# Patient Record
Sex: FEMALE | Race: WHITE | Employment: FULL TIME | URBAN - METROPOLITAN AREA
[De-identification: names, ages, dates, MRNs, and addresses within clinical notes are randomized per-mention and may not be internally consistent; named-entity substitution may affect disease eponyms.]

---

## 2018-11-19 ENCOUNTER — HOSPITAL ENCOUNTER (EMERGENCY)
Age: 48
Discharge: HOME | End: 2018-11-19
Attending: EMERGENCY MEDICINE
Payer: COMMERCIAL

## 2018-11-19 VITALS
HEART RATE: 84 BPM | RESPIRATION RATE: 18 BRPM | OXYGEN SATURATION: 98 % | HEIGHT: 65 IN | SYSTOLIC BLOOD PRESSURE: 130 MMHG | TEMPERATURE: 98 F | DIASTOLIC BLOOD PRESSURE: 84 MMHG | WEIGHT: 167 LBS | BODY MASS INDEX: 27.82 KG/M2

## 2018-11-19 DIAGNOSIS — S13.4XXA WHIPLASH INJURY TO NECK, INITIAL ENCOUNTER: ICD-10-CM
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
## 2018-11-19 PROCEDURE — 74011250637 HC RX REV CODE- 250/637: Performed by: EMERGENCY MEDICINE

## 2018-11-19 PROCEDURE — 99282 EMERGENCY DEPT VISIT SF MDM: CPT | Performed by: EMERGENCY MEDICINE

## 2018-11-19 RX ORDER — IBUPROFEN 800 MG/1
800 TABLET ORAL ONCE
Status: COMPLETED | OUTPATIENT
Start: 2018-11-19 | End: 2018-11-19

## 2018-11-19 RX ADMIN — IBUPROFEN 800 MG: 800 TABLET ORAL at 01:42
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
## 2018-11-19 NOTE — DISCHARGE INSTRUCTIONS
Rest and use Tylenol or ibuprofen for pain. Apply heating pad to ease the muscle spasm. Return to the emergency department for any other acute concerns.

## 2018-11-19 NOTE — ED NOTES
I have reviewed discharge instructions with the patient. The patient verbalized understanding. Patient left ED via Discharge Method: ambulatory to Home with (daughter). Opportunity for questions and clarification provided. Patient given 0 scripts. To continue your aftercare when you leave the hospital, you may receive an automated call from our care team to check in on how you are doing. This is a free service and part of our promise to provide the best care and service to meet your aftercare needs.  If you have questions, or wish to unsubscribe from this service please call [phone]. Thank you for Choosing our [employer] Emergency Department.

## 2018-11-19 NOTE — LETTER
[facility] EMERGENCY DEPT 
[facility] [address] [place] [id number] 
[phone] Work/School Note Date: 11/19/2018 To Whom It May concern: 
 
Rebecca Bourne was seen and treated today in the emergency room by the following provider(s): 
Attending Provider: Wilhemena Collet, MD. Rebecca Bourne, daughter, Gabriela Levin was in the emergency room with her mother and will not be able to attend am classes Sincerely, Lolly Harvey RN

## 2020-11-02 ENCOUNTER — TELEPHONE (OUTPATIENT)
Dept: PHARMACY | Age: 50
End: 2020-11-02

## 2020-11-02 NOTE — TELEPHONE ENCOUNTER
**Patient has 2 EMRS; Other EMR has no documentation**    CLINICAL PHARMACY CONSULT: NICOTINE REPLACEMENT THERAPY     SUBJECTIVE  Derl Levels currently identified as interested in nicotine replacement therapy through the [employer] Breathe Easy program.     Response per Nancy Eldridge:    Consent:  Yes I consent to enrolling in the [facility] Cessation Treatment program and having my information reviewed by a [facility] pharmacist.    Current Nicotine Products Used: E-Cigarettes/E-Vaping  # Of Cigarettes/day: Left Blank  Time Of 1st Cigarette: Left Blank  Cigar/cigarillo Frequency: Left Blank   Smokeless Tobacco Frequency: Left Blank  Ecig/vaping Frequency: 21 or more days  Other Nicotine Product Frequency: Left Blank  Health Conditions: None of these conditions apply to me.   Pregnant: No  Dentures/dental Work: No  Tried Quitting: Yes  Methods Used: Nicotine patch  Methods products To Try: Wellbutrin, Zyban, or other bupropion  Other Methods products To Try: Left Blank      Methods Patient interested in: Wellbutrin, Zyban, or other bupropion  Mailing address: [address] [place] [id number]  Phone number: Left Blank

## 2020-11-02 NOTE — LETTER
Vinny [address] [facility] Wil[address] 10 Phone: [phone], option 7 Marcelo Patricia [facility] [place] [id number]  
 
 
 
 
11/20/20 Dear Marcelo Patricia, We have been trying to reach you over the phone to follow up on the Breathe Easy questionnaire you completed through [employer]. You indicated on your answers that you may be interested in trying bupropion to help you quit smoking. Chantix, bupropion, nicotine patches, nicotine gum, and/or nicotine lozenges are available to you at no cost with a prescription through the pharmacy. Please contact us if you are interested in these medications and would like assistance getting prescriptions to the pharmacy. Thank you, 
Lillian Casillas 2 Team 
Telephone: [phone], Option #7 Fax: [phone] Email: [email or web address]

## 2020-11-13 NOTE — TELEPHONE ENCOUNTER
CLINICAL PHARMACY CONSULT: BUPROPION     SUBJECTIVE  James Finn is a 53 y.o. female currently identified as interested in bupropion through the [employer] [facility].     OBJECTIVE  CrCl cannot be calculated (No successful lab value found. ). Social History     Tobacco Use    Smoking status: Current Every Day Smoker     Packs/day: 0.50    Smokeless tobacco: Never Used    Tobacco comment: quit 2011   Substance Use Topics    Alcohol use: No       Responses per [employer] Questionnaire:    Consent: Yes I consent to enrolling in the [facility] Cessation Treatment program and having my information reviewed by a [facility] pharmacist.    Current Nicotine Products Used: E-Cigarettes/E-Vaping  # Of Cigarettes/day: Left Blank  Time Of 1st Cigarette: Left Blank  Cigar/cigarillo Frequency: Left Blank   Smokeless Tobacco Frequency: Left Blank  Ecig/vaping Frequency: 21 or more days  Other Nicotine Product Frequency: Left Blank  Health Conditions: None of these conditions apply to me. Pregnant: No  Dentures/dental Work: No  Tried Quitting: Yes  Methods Used: Nicotine patch  Methods products To Try: Wellbutrin, Zyban, or other bupropion  Other Methods products To Try: Left Blank      Methods Patient interested in: Wellbutrin, Zyban, or other bupropion  M[place] address[place] [address]  Phone number: Left Blank    ASSESSMENT    Has the patient tried quitting smoking in the past? Yes, nicotine patches    PLAN    Patient is a candidate for bupropion therapy    Attempted to reach patient to discuss medications selection for smoking cessation through [employer] Cessation program. Unable to leave message for patient to return phone call to [phone]. Will continue to contact as as appropriate.     Dio Pulliam, PharmD, [address] [facility] [phone], option 7

## 2020-11-20 NOTE — TELEPHONE ENCOUNTER
Attempted to reach patient to discuss smoking cessation therapy. Unable to leave message for patient to return phone call. Will prepare and send letter. No further outreach planned at this time.     Dieudonne Finnegan, PharmD, [address] [facility] [phone], option 7    CLINICAL PHARMACY CONSULT: MED RECONCILIATION/REVIEW ADDENDUM  For Pharmacy Admin Tracking Only  PHSO: PHSO Patient?: Yes  Total # of Interventions Recommended: Count: 1  - New Order #: 1 New Medication Order Reason(s): Needs Additional Medication Therapy  Recommended intervention potential cost savings: 0  Total Interventions Accepted: 0  Time Spent (min): 15

## 2022-05-19 PROBLEM — M75.02 ADHESIVE CAPSULITIS OF LEFT SHOULDER: Status: ACTIVE | Noted: 2022-05-19

## 2022-05-26 DIAGNOSIS — M75.02 ADHESIVE CAPSULITIS OF LEFT SHOULDER: Primary | ICD-10-CM

## 2022-06-29 ENCOUNTER — HOSPITAL ENCOUNTER (OUTPATIENT)
Dept: PHYSICAL THERAPY | Age: 52
Setting detail: RECURRING SERIES
Discharge: HOME | End: 2022-07-02
Payer: COMMERCIAL

## 2022-06-29 PROCEDURE — 97110 THERAPEUTIC EXERCISES: CPT

## 2022-06-29 PROCEDURE — 97161 PT EVAL LOW COMPLEX 20 MIN: CPT

## 2022-06-29 ASSESSMENT — PAIN SCALES - GENERAL: PAINLEVEL_OUTOF10: 9

## 2022-06-29 NOTE — PLAN OF CARE
Ellen Perdomo  : 1970  Primary: [facility] *  Secondary:  [address] @ [facility] Therapy  [address] [place] [address] [facility] [address]  Phone: [phone]  Fax: [phone] Plan Frequency: 2x/week for 90 days    Plan of Care/Certification Expiration Date: 22      PT Visit Info:    No data recorded    OUTPATIENT PHYSICAL THERAPY:OP NOTE TYPE: Initial Assessment 2022               Episode  Appt Desk         Treatment Diagnosis:  Pain in Left Shoulder (M25.512)  Stiffness of Left Shoulder, Not elsewhere classified (M25.612)  Medical/Referring Diagnosis:  No admission diagnoses are documented for this encounter. Referring Physician:  Parmjit Pearson MD MD Orders:  PT Eval and Treat   Return MD Appt:  6 weeks  Date of Onset:  Onset Date: 21     Allergies:  Patient has no allergy information on record. Restrictions/Precautions:    No data recordedNo data recorded   Medications Last Reviewed:  2022     SUBJECTIVE   History of Injury/Illness (Reason for Referral):  Pt received covid vaccine a year ago and has had ongoing left shoulder pain since that time. ROM has progressively worsened and pain has lingered with daily activities to include reaching, lifting and typical use of the shoulder for dressing, bathing  Patient Stated Goal(s):  \"to decrease pain, return to normal use of the arm\"  Initial:     9/10 Post Session:     8/10  Past Medical History/Comorbidities:   Ms. Abdirahman Casey  has a past medical history of Morbid obesity with BMI of 45.0-49.9, adult ([address] and Tachycardia. Ms. Abdirahman Casey  has no past surgical history on file.   Social History/Living Environment:   Lives With: Spouse  Type of Home: House     Prior Level of Function/Work/Activity:   Prior level of function: Independent  No data recordedNo data recorded   Learning:   Does the patient/guardian have any barriers to learning?: No barriers  Will there be a co-learner?: No  What is the preferred language of the patient/guardian?: English  Is an  required?: No  How does the patient/guardian prefer to learn new concepts?: Listening; Reading; Demonstration; Pictures/Videos     Fall Risk Scale: Total Score: 0  Tobar Fall Risk: Low (0-24)     Dominant Side:  left handed        OBJECTIVE   UE ROM:       R UE   LE UE   Flex 160 73   Abd 170 53   ER 65 35   IR 75 35       UE strength measures:     R UE L UE   Flex  5 /5  4- /5   Abduction  5 /5  4- /5   ER  5 /5  4- /5   IR  5 /5  4- /5   biceps  5 /5  4- /5   triceps  5 /5  4- /5       ASSESSMENT   Initial Assessment:  Pt continues to have increased left arm pain/shoulder pain due to effects of frozen shoulder. MRI positive for thickening of capsule. Some mild cuff tearing that appears to be old-not full thickness. Limited ROM, strength  Problem List: (Impacting functional limitations): Body Structures, Functions, Activity Limitations Requiring Skilled Therapeutic Intervention: Decreased functional mobility ; Decreased ADL status; Decreased ROM; Decreased body mechanics; Decreased strength; Decreased high-level IADLs; Increased pain; Decreased posture     Therapy Prognosis:   Therapy Prognosis: Good     Assessment Complexity:   Low Complexity  PLAN   Effective Dates: 6/29/22 TO Plan of Care/Certification Expiration Date: 09/29/22     Frequency/Duration: Plan Frequency: 2x/week for 90 days     Interventions Planned (Treatment may consist of any combination of the following):    Current Treatment Recommendations: Strengthening; ROM; Functional mobility training; ADL/Self-care training; IADL training; Neuromuscular re-education; Manual Therapy - Soft Tissue Mobilization; Manual Therapy - Joint Manipulation; Pain management; Return to work related activity; Home exercise program; Safety education & training; Patient/Caregiver education & training; Modalities; Positioning; Integrated dry needling;  Therapeutic activities     Goals: (Goals have been discussed and agreed upon with patient.)  Short-Term Functional Goals: Time Frame: 6 weeks  1. Pt will be independent with HEP. 2. Pt will be able to increase ROM by at least 10-15 degrees for improved reaching. 3. Pt will be able to dress with little compensation. Discharge Goals: Time Frame: 12 weeks  1. Pt will be able to style her hair. 2. Pt will begin sleeping on left side. 3. Pt will be able to increase ROM to at least 130 degrees or more for elevation and at least 40 degrees or more for rotation. 4. Pt will be able to lift and carry up to 10 pounds. Outcome Measure: Tool Used: SARAH Shoulder Score  Initial Score: 31/100 Most Recent: X/100 (Date: XX/XX)   Interpretation of Score: 20 questions each scored on a 3 point scale with 0 representing \"extreme difficulty or unable to perform\" and 3 representing \"no difficulty\", 3 questions each scored from 0-10 about pain, and 1 question scored 0-10 about function of the shoulder  The lower the score, the greater the functional disability. 100/100 represents no disability, pain or loss of function. Minimal clinically important difference is 11.4. Minimal detectable change is 12.1. Medical Necessity:    Skilled intervention continues to be required due to pain and loss of motion and strength. Reason For Services/Other Comments:   Patient continues to require skilled intervention due to increased pain and severe limitations in ROM and strength. Total Duration: Eval plus TE  Time In: 0845  Time Out: 0930    Regarding Jayna Young's therapy, I certify that the treatment plan above will be carried out by a therapist or under their direction. Thank you for this referral,  Catie Morin PT     Referring Physician Signature: Pauline Meigs, MD No Signature is Required for this note.         Post Session Pain  Charge Capture  PT Visit Info  POC Link  Treatment Note Link  MD Guidelines  [facility]

## 2022-06-29 NOTE — PROGRESS NOTES
Buzz Andrey  : 1970  Primary: [facility] *  Secondary:  [address] @ [facility] Therapy  [address] [facility] [address]  Phone: [phone]  Fax: [phone] Plan Frequency: 2x/week for 90 days    Plan of Care/Certification Expiration Date: 22      PT Visit Info:   No data recorded    OUTPATIENT PHYSICAL THERAPY:OP NOTE TYPE: Treatment Note 2022       Episode  }Appt Desk              Treatment Diagnosis:  Pain in Left Shoulder (M25.512)  Stiffness of Left Shoulder, Not elsewhere classified (M25.612)  Medical/Referring Diagnosis:  No admission diagnoses are documented for this encounter. Referring Physician:  Iram Iqbal MD MD Orders:  PT Eval and Treat   Date of Onset:  Onset Date: 21     Allergies:   Patient has no allergy information on record. Restrictions/Precautions:  No data recordedNo data recorded   Interventions Planned (Treatment may consist of any combination of the following):    Current Treatment Recommendations: Strengthening; ROM; Functional mobility training; ADL/Self-care training; IADL training; Neuromuscular re-education; Manual Therapy - Soft Tissue Mobilization; Manual Therapy - Joint Manipulation; Pain management; Return to work related activity; Home exercise program; Safety education & training; Patient/Caregiver education & training; Modalities; Positioning; Integrated dry needling; Therapeutic activities     Subjective Comments:  Pt reports severe pain in shoulder  Initial:}    9/10Post Session:       8/10  Medications Last Reviewed:  2022  Updated Objective Findings:  See evaluation note from today  Treatment   THERAPEUTIC EXERCISE: (15 minutes):    Exercises per grid below to improve mobility and strength. Required minimal verbal cues to promote proper body mechanics. Progressed resistance, range and repetitions as indicated.   Supine wand flexion, ER, h. Ab/add x 12  Standing wand extension x 10  Passive motion in supine    MANUAL THERAPY: (5 minutes):   Joint mobilization and Soft tissue mobilization was utilized and necessary because of the patient's restricted joint motion, loss of articular motion and restricted motion of soft tissue. GHJ mobs post and inferior direction Grade III and IV     Treatment/Session Summary:    Treatment Assessment:  Pt learned HEP to follow. Instructed in importance of performing on nontherapy days. Communication/Consultation:  None today  Equipment provided today:  None  Recommendations/Intent for next treatment session: Next visit will focus on continued ROM, manual therapy.     Total Treatment Billable Duration:  45 minutes   Time In: 0845  Time Out: [address], PT       Charge Capture  }Post Session Pain  PT Visit Info  [employer] [facility]  MD Guidelines  Scanned Media  Benefits  [employer]    Future Appointments   Date Time Provider [place]   6/30/2022  8:00 AM Faviola Mirza MD [facility] AMB   7/1/2022 11:45 AM Betsy Earl, PT [facility]FO   7/5/2022 12:30 PM Betsy Earl, PT [facility] O   7/7/2022 12:30 PM Betsy Earl, PT [facility]   7/11/2022 12:30 PM Betsy Earl, PT [facility]   7/13/2022  8:45 AM Betsy Earl, PT [facility]   7/18/2022 12:30 PM Betsy Earl, PT [facility]   7/21/2022  8:45 AM Betsy Earl, PT [facility] O

## 2022-06-30 ENCOUNTER — OFFICE VISIT (OUTPATIENT)
Dept: ORTHOPEDIC SURGERY | Age: 52
End: 2022-06-30

## 2022-06-30 DIAGNOSIS — M75.02 ADHESIVE CAPSULITIS OF LEFT SHOULDER: Primary | ICD-10-CM

## 2022-06-30 PROCEDURE — 99213 OFFICE O/P EST LOW 20 MIN: CPT | Performed by: ORTHOPAEDIC SURGERY

## 2022-06-30 NOTE — LETTER
[address],[facility] [address] [address]  Phone: [phone]  Fax: [phone]    Kailee Benson MD        June 30, 2022     Patient: Ester Rosado   YOB: 1970   Date of Visit: 6/30/2022       To Whom It May Concern: It is my medical opinion that Mohsen Whitlock may return to work on 06/30/2022 with the following restrictions: Work restrictions will include no overhead use of the left arm no repetitive use of the left arm and she may need accommodations for uniform due to limitation of her shoulder motion limiting dressing    If you have any questions or concerns, please don't hesitate to call.     Sincerely,        Kailee Benson MD

## 2022-06-30 NOTE — PROGRESS NOTES
Name: Venkat Farah  YOB: 1970  Gender: female  MRN: [id number]      CC: Follow-up (left shoulder)       HPI: Venkat Farah is a 49 y.o. female who returns for follow up on her left shoulder adhesive capsulitis. Unfortunately her first visit to physical therapy was yesterday. She was prescribed prednisone and diclofenac at her last visit. She had temporary pain relief with the prednisone but states that her symptoms have returned. The diclofenac was not effective and she has stopped taking at this time and is taking over the counter tylenol and motrin. Physical Examination:  General: no acute distress  Lungs: breathing easily  CV: regular rhythm by pulse  Left Shoulder: No obvious deformity of the biceps. Tenderness to palpation in the lateral shoulder around the deltoid. Active forward flexion to 90 degrees limited by pain as well as, passively to 100 degrees. Abduction to 45 degrees. Overall arc of motion 50 degrees arm in abduction. Assessment:     ICD-10-CM    1. Adhesive capsulitis of left shoulder  M75.02        Plan: At this time the patient has not made much progress with her shoulder she did not get into formal physical therapy until yesterday. Unfortunately I think this is significantly delayed her recovery from this injury. I think at this time we will continue with physical therapy to help regain her motion. If she feels as though she is not making progress with formal physical therapy she can call the office for consideration of intra-articular shoulder injection. Work restrictions will include no overhead use of the left arm no repetitive use of the left arm and she may need accommodations for uniform due to limitation of her shoulder motion limiting dressing      Mojgan Palma NP dictating as a scribe for MD Lotus Hill MD, [address] [facility]

## 2022-07-01 ENCOUNTER — HOSPITAL ENCOUNTER (OUTPATIENT)
Dept: PHYSICAL THERAPY | Age: 52
Setting detail: RECURRING SERIES
Discharge: HOME | End: 2022-07-04
Payer: COMMERCIAL

## 2022-07-01 PROCEDURE — 97110 THERAPEUTIC EXERCISES: CPT

## 2022-07-01 PROCEDURE — 97140 MANUAL THERAPY 1/> REGIONS: CPT

## 2022-07-01 ASSESSMENT — PAIN SCALES - GENERAL: PAINLEVEL_OUTOF10: 8

## 2022-07-01 NOTE — PROGRESS NOTES
Charito Bach  : 1970  Primary: Dusty [facility] *  Secondary:  [address] @ [facility]  [address] [facility] [place] [address]  Phone: [phone]  Fax: [phone] Plan Frequency: 2x/week for 90 days    Plan of Care/Certification Expiration Date: 22      PT Visit Info:   No data recorded    OUTPATIENT PHYSICAL THERAPY:OP NOTE TYPE: Treatment Note 2022       Episode  }Appt Desk              Treatment Diagnosis:  Pain in Left Shoulder (M25.512)  Stiffness of Left Shoulder, Not elsewhere classified (M25.612)  Medical/Referring Diagnosis:  No admission diagnoses are documented for this encounter. Referring Physician:  Lynn Mark MD MD Orders:  PT Eval and Treat   Date of Onset:  Onset Date: 21     Allergies:   Patient has no allergy information on record. Restrictions/Precautions:  No data recordedNo data recorded   Interventions Planned (Treatment may consist of any combination of the following):    Current Treatment Recommendations: Strengthening; ROM; Functional mobility training; ADL/Self-care training; IADL training; Neuromuscular re-education; Manual Therapy - Soft Tissue Mobilization; Manual Therapy - Joint Manipulation; Pain management; Return to work related activity; Home exercise program; Safety education & training; Patient/Caregiver education & training; Modalities; Positioning; Integrated dry needling; Therapeutic activities     Subjective Comments:  Pt reports arm is sore  Initial:}    8/10Post Session:       7/10  Medications Last Reviewed:  2022  Updated Objective Findings:  None Today  Treatment   THERAPEUTIC EXERCISE: (25 minutes):    Exercises per grid below to improve mobility and strength. Required minimal verbal cues to promote proper body mechanics. Progressed resistance, range and repetitions as indicated.   UBE x 6 revolutions with assistance  Pulleys flex and scaption x 5 min  Wall washing x 10  Flexion walkouts -hand on table x 10  Seated t-bar flexion x 15    MANUAL THERAPY: (20 minutes):   Joint mobilization and Soft tissue mobilization was utilized and necessary because of the patient's restricted joint motion, loss of articular motion and restricted motion of soft tissue. GHJ mobs post and inferior direction Grade III and IV   Combined with PROM  Treatment/Session Summary:    Treatment Assessment:  Post treatment today, pt was able to raise the arm over 90 degrees. Reports of arm feeling sore, but less painful overall. Encouraged pt to use the arm as much as she can  Communication/Consultation:  None today  Equipment provided today:  None  Recommendations/Intent for next treatment session: Next visit will focus on continued ROM, manual therapy.     Total Treatment Billable Duration:  45 minutes   Time In: 6426  Time Out: [address], PT       Charge Capture  }Post Session Pain  PT Visit Info  [employer] Portal  MD Guidelines  Scanned Media  Benefits  MyChart    Future Appointments   Date Time Provider Gayle Devlin   7/6/2022  4:00 PM [address], [facility]   7/7/2022 12:30 PM Meliton Mendosa, PT [facility]   7/11/2022  4:00 PM [address], PTA [facility]   7/13/2022  8:45 AM Meliton Mendsoa, PT [facility]   7/18/2022  4:00 PM [address], PTA [facility]   7/21/2022  8:45 AM Meliton Mendosa, PT [facility]   8/11/2022  8:00 AM Chantell Severino MD [facility] AMB

## 2022-07-05 ENCOUNTER — APPOINTMENT (OUTPATIENT)
Dept: PHYSICAL THERAPY | Age: 52
End: 2022-07-05
Payer: COMMERCIAL

## 2022-07-06 ENCOUNTER — HOSPITAL ENCOUNTER (OUTPATIENT)
Dept: PHYSICAL THERAPY | Age: 52
Setting detail: RECURRING SERIES
Discharge: HOME | End: 2022-07-09
Payer: COMMERCIAL

## 2022-07-06 PROCEDURE — 97110 THERAPEUTIC EXERCISES: CPT

## 2022-07-06 PROCEDURE — 97140 MANUAL THERAPY 1/> REGIONS: CPT

## 2022-07-06 RX ORDER — DICLOFENAC SODIUM 75 MG/1
TABLET, DELAYED RELEASE ORAL
Qty: 60 TABLET | Refills: 1 | Status: SHIPPED | OUTPATIENT
Start: 2022-07-06 | End: 2022-10-21 | Stop reason: SDUPTHER

## 2022-07-06 ASSESSMENT — PAIN SCALES - GENERAL: PAINLEVEL_OUTOF10: 7

## 2022-07-06 NOTE — PROGRESS NOTES
Haresh Bundy  : 1970  Primary: Luis Durán [facility] *  Secondary:  [address] @ [facility]  [address] [facility] Cherri Chávez [place] [address]  Phone: [phone]  Fax: [phone] Plan Frequency: 2x/week for 90 days    Plan of Care/Certification Expiration Date: 22      PT Visit Info:   No data recorded    OUTPATIENT PHYSICAL THERAPY:OP NOTE TYPE: Treatment Note 2022       Episode  }Appt Desk              Treatment Diagnosis:  Pain in Left Shoulder (M25.512)  Stiffness of Left Shoulder, Not elsewhere classified (M25.612)  Medical/Referring Diagnosis:  No admission diagnoses are documented for this encounter. Referring Physician:  Jamilah Perez MD MD Orders:  PT Eval and Treat   Date of Onset:  Onset Date: 21     Allergies:   Patient has no allergy information on record. Restrictions/Precautions:  No data recordedNo data recorded   Interventions Planned (Treatment may consist of any combination of the following):    Current Treatment Recommendations: Strengthening; ROM; Functional mobility training; ADL/Self-care training; IADL training; Neuromuscular re-education; Manual Therapy - Soft Tissue Mobilization; Manual Therapy - Joint Manipulation; Pain management; Return to work related activity; Home exercise program; Safety education & training; Patient/Caregiver education & training; Modalities; Positioning; Integrated dry needling; Therapeutic activities     Subjective Comments:  Patient reports she has been having some shooting pain up her arm. She states she called [facility] today. Initial:}    7/10Post Session:       7/10  Medications Last Reviewed:  2022  Updated Objective Findings:  None Today  Treatment   THERAPEUTIC EXERCISE: (25 minutes):    Exercises per grid below to improve mobility and strength. Required minimal verbal cues to promote proper body mechanics. Progressed resistance, range and repetitions as indicated.   UBE x 6 revolutions with assistance  Pulleys flex and scaption x 5 min  Wall washing x 10  Flexion walkouts -hand on table x 10  Seated t-bar flexion x 15  Pendulum in all directions 2 x 1 min    MANUAL THERAPY: (20 minutes):   Joint mobilization and Soft tissue mobilization was utilized and necessary because of the patient's restricted joint motion, loss of articular motion and restricted motion of soft tissue. GHJ mobs post and inferior direction Grade III and IV   Combined with PROM  Treatment/Session Summary:    Treatment Assessment:  Patient tolerated treatment with moderate discomfort. She indicated that her arm feels like she just got her vacine. Communication/Consultation:  None today  Equipment provided today:  None  Recommendations/Intent for next treatment session: Next visit will focus on continued ROM, manual therapy.     Total Treatment Billable Duration:  45 minutes   Time In: 0400  Time Out: [address], PTA       Charge Capture  }Post Session Pain  PT Visit Info  [employer] Portal  MD Guidelines  Scanned Media  Benefits  MyChart    Future Appointments   Date Time Provider Gayle Dixonisti   7/7/2022 12:30 PM Dea Cobb, PT [facility]   7/11/2022  4:00 PM [address], [facility]   7/13/2022  8:45 AM Dea Cobb, PT [facility]   7/18/2022  4:00 PM [address], PTA [facility]   7/21/2022  8:45 AM Dea Cobb, PT [facility]   8/11/2022  8:00 AM Tay Carter MD [facility] AMB

## 2022-07-07 ENCOUNTER — HOSPITAL ENCOUNTER (OUTPATIENT)
Dept: PHYSICAL THERAPY | Age: 52
Setting detail: RECURRING SERIES
Discharge: HOME | End: 2022-07-10
Payer: COMMERCIAL

## 2022-07-07 PROCEDURE — 97140 MANUAL THERAPY 1/> REGIONS: CPT

## 2022-07-07 PROCEDURE — 97110 THERAPEUTIC EXERCISES: CPT

## 2022-07-07 ASSESSMENT — PAIN SCALES - GENERAL: PAINLEVEL_OUTOF10: 7

## 2022-07-07 NOTE — PROGRESS NOTES
Jose Case  : 1970  Primary: Doyle Chapman [facility] *  Secondary:  [address] @ [facility]  [address] [facility] [place] [address] [address]  Phone: [phone]  Fax: [phone] Plan Frequency: 2x/week for 90 days    Plan of Care/Certification Expiration Date: 22      PT Visit Info:   No data recorded    OUTPATIENT PHYSICAL THERAPY:OP NOTE TYPE: Treatment Note 2022       Episode  }Appt Desk              Treatment Diagnosis:  Pain in Left Shoulder (M25.512)  Stiffness of Left Shoulder, Not elsewhere classified (M25.612)  Medical/Referring Diagnosis:  No admission diagnoses are documented for this encounter. Referring Physician:  John Hansen MD MD Orders:  PT Eval and Treat   Date of Onset:  Onset Date: 21     Allergies:   Patient has no allergy information on record. Restrictions/Precautions:  No data recordedNo data recorded   Interventions Planned (Treatment may consist of any combination of the following):    Current Treatment Recommendations: Strengthening; ROM; Functional mobility training; ADL/Self-care training; IADL training; Neuromuscular re-education; Manual Therapy - Soft Tissue Mobilization; Manual Therapy - Joint Manipulation; Pain management; Return to work related activity; Home exercise program; Safety education & training; Patient/Caregiver education & training; Modalities; Positioning; Integrated dry needling; Therapeutic activities     Subjective Comments:  Pt reports arm feels heavy  Initial:}    7/10Post Session:       7/10  Medications Last Reviewed:  2022  Updated Objective Findings:  None Today  Treatment   THERAPEUTIC EXERCISE: (25 minutes):    Exercises per grid below to improve mobility and strength. Required minimal verbal cues to promote proper body mechanics. Progressed resistance, range and repetitions as indicated.   UBE x 6 revolutions with assistance  Pulleys flex and scaption x 5 min  Wall washing x 10 forward and diagonally  scap retract red TB x 20  Seated t-bar flexion x 15 with stick against back of chair  Pendulum in all directions 2 x 1 min-held      MANUAL THERAPY: (20 minutes):   Joint mobilization and Soft tissue mobilization was utilized and necessary because of the patient's restricted joint motion, loss of articular motion and restricted motion of soft tissue. GHJ mobs post and inferior direction Grade III and IV   Combined with PROM  Treatment/Session Summary:    Treatment Assessment:  Pt improving greatly. She has 143 degrees of active shoulder motion and 110 degrees of active abduction. Arm still feels heavy and she still has some pain  Communication/Consultation:  None today  Equipment provided today:  None  Recommendations/Intent for next treatment session: Next visit will focus on continued ROM, manual therapy.     Total Treatment Billable Duration:  45 minutes   Time In: 6293  Time Out: SHAMEKA Klein       Charge Capture  }Post Session Pain  PT Visit Info  [employer] Portal  MD Guidelines  Scanned Media  Benefits  MyChart    Future Appointments   Date Time Provider Gayle Devlin   7/11/2022  4:00 PM [address], [facility]   7/13/2022  8:45 AM Betsy Earl, PT [facility]   7/18/2022  4:00 PM [address], [facility]   7/21/2022  8:45 AM Betsy Earl PT [facility] O   8/11/2022  8:00 AM Faviola Mirza MD [facility] AMB

## 2022-07-07 NOTE — PROGRESS NOTES
Brandyn Isidro  : 1970  Primary: Nu Simeon [facility] *  Secondary:  [address] @ [facility]  [address] [facility] Demond Manrique [place] [address]  Phone: [phone]  Fax: [phone] Plan Frequency: 2x/week for 90 days    Plan of Care/Certification Expiration Date: 22      PT Visit Info:   No data recorded    OUTPATIENT PHYSICAL THERAPY:OP NOTE TYPE: Treatment Note 2022       Episode  }Appt Desk              Treatment Diagnosis:  Pain in Left Shoulder (M25.512)  Stiffness of Left Shoulder, Not elsewhere classified (M25.612)  Medical/Referring Diagnosis:  No admission diagnoses are documented for this encounter. Referring Physician:  Marin Juarez MD MD Orders:  PT Eval and Treat   Date of Onset:  Onset Date: 21     Allergies:   Patient has no allergy information on record. Restrictions/Precautions:  No data recordedNo data recorded   Interventions Planned (Treatment may consist of any combination of the following):    Current Treatment Recommendations: Strengthening; ROM; Functional mobility training; ADL/Self-care training; IADL training; Neuromuscular re-education; Manual Therapy - Soft Tissue Mobilization; Manual Therapy - Joint Manipulation; Pain management; Return to work related activity; Home exercise program; Safety education & training; Patient/Caregiver education & training; Modalities; Positioning; Integrated dry needling; Therapeutic activities     Subjective Comments:  Pt reports arm feels heavy  Initial:}    7/10Post Session:       7/10  Medications Last Reviewed:  2022  Updated Objective Findings:  None Today  Treatment   THERAPEUTIC EXERCISE: (25 minutes):    Exercises per grid below to improve mobility and strength. Required minimal verbal cues to promote proper body mechanics. Progressed resistance, range and repetitions as indicated.   UBE x 6 revolutions with assistance  Pulleys flex and scaption x 5 min  Wall washing x 10  Flexion walkouts -hand on table x 10  Seated t-bar flexion x 15  Pendulum in all directions 2 x 1 min  t-bar flexion up against wall     MANUAL THERAPY: (20 minutes):   Joint mobilization and Soft tissue mobilization was utilized and necessary because of the patient's restricted joint motion, loss of articular motion and restricted motion of soft tissue. GHJ mobs post and inferior direction Grade III and IV   Combined with PROM  Treatment/Session Summary:    · Treatment Assessment:     · Communication/Consultation:  None today  · Equipment provided today:  None  · Recommendations/Intent for next treatment session: Next visit will focus on continued ROM, manual therapy.     Total Treatment Billable Duration:  45 minutes   Time In: [address] [place] [address]  }Post Session Pain  PT Visit [address] [facility] Portal  MD Guidelines  Scanned Media  Benefits  MyChart    Future Appointments   Date Time Provider Gayle Devlin   7/11/2022  4:00 PM [address], [facility]   7/13/2022  8:45 AM Luis Parnell PT [facility]   7/18/2022  4:00 PM [address], [facility]   7/21/2022  8:45 AM Luis Parnell PT [facility] O   8/11/2022  8:00 AM Alex Tanner MD [facility] AMB

## 2022-07-11 ENCOUNTER — HOSPITAL ENCOUNTER (OUTPATIENT)
Dept: PHYSICAL THERAPY | Age: 52
Setting detail: RECURRING SERIES
Discharge: HOME | End: 2022-07-14
Payer: COMMERCIAL

## 2022-07-11 PROCEDURE — 97110 THERAPEUTIC EXERCISES: CPT

## 2022-07-11 ASSESSMENT — PAIN SCALES - GENERAL: PAINLEVEL_OUTOF10: 5

## 2022-07-11 NOTE — PROGRESS NOTES
Buzz Andrey  : 1970  Primary: Barry [facility] *  Secondary:  [address] @ [facility]  [address] [facility] [address]  Phone: [phone]  Fax: [phone] Plan Frequency: 2x/week for 90 days    Plan of Care/Certification Expiration Date: 22      PT Visit Info:   No data recorded    OUTPATIENT PHYSICAL THERAPY:OP NOTE TYPE: Treatment Note 2022       Episode  }Appt Desk              Treatment Diagnosis:  Pain in Left Shoulder (M25.512)  Stiffness of Left Shoulder, Not elsewhere classified (M25.612)  Medical/Referring Diagnosis:  No admission diagnoses are documented for this encounter. Referring Physician:  Iram Iqbal MD MD Orders:  PT Eval and Treat   Date of Onset:  Onset Date: 21     Allergies:   Patient has no allergy information on record. Restrictions/Precautions:  No data recordedNo data recorded   Interventions Planned (Treatment may consist of any combination of the following):    Current Treatment Recommendations: Strengthening; ROM; Functional mobility training; ADL/Self-care training; IADL training; Neuromuscular re-education; Manual Therapy - Soft Tissue Mobilization; Manual Therapy - Joint Manipulation; Pain management; Return to work related activity; Home exercise program; Safety education & training; Patient/Caregiver education & training; Modalities; Positioning; Integrated dry needling; Therapeutic activities     Subjective Comments:  Patient reports that she called the doctor and told them about the swelling and is sheduled to see them this Thursday. Initial:}    5/10Post Session:       5/10  Medications Last Reviewed:  2022  Updated Objective Findings:  None Today  Treatment   THERAPEUTIC EXERCISE: ( minutes):    Exercises per grid below to improve mobility and strength. Required minimal verbal cues to promote proper body mechanics. Progressed resistance, range and repetitions as indicated.   UBE x 6

## 2022-07-13 ENCOUNTER — HOSPITAL ENCOUNTER (OUTPATIENT)
Dept: PHYSICAL THERAPY | Age: 52
Setting detail: RECURRING SERIES
End: 2022-07-13
Payer: COMMERCIAL

## 2022-07-13 NOTE — PROGRESS NOTES
Diogenes Givens  : 1970  Primary: Marylen Foyer [facility] *  Secondary:  [address] @ [facility]  [address] [place] [address] [place] [address]  Phone: [phone]  Fax: [phone] Plan Frequency: 2x/week for 90 days    Plan of Care/Certification Expiration Date: 22      PT Visit Info:  No data recorded       OUTPATIENT PHYSICAL THERAPY 2022     Appt Desk   Episode   MyChart      Pt called to cancel appointment today due to illness    Future Appointments   Date Time Provider Gayle Devlin   2022  8:20 AM Jim Chirinos MD [facility] AMB   2022  1:00 PM Khloe Mueller, PTA [facility]   2022  8:45 AM Yeison Judd, PT [facility]   2022  8:00 AM Jim Chirinos MD [facility] AMB

## 2022-07-14 ENCOUNTER — OFFICE VISIT (OUTPATIENT)
Dept: ORTHOPEDIC SURGERY | Age: 52
End: 2022-07-14

## 2022-07-14 DIAGNOSIS — M75.02 ADHESIVE CAPSULITIS OF LEFT SHOULDER: Primary | ICD-10-CM

## 2022-07-14 RX ORDER — TRIAMCINOLONE ACETONIDE 40 MG/ML
40 INJECTION, SUSPENSION INTRA-ARTICULAR; INTRAMUSCULAR ONCE
Status: COMPLETED | OUTPATIENT
Start: 2022-07-14 | End: 2022-07-14

## 2022-07-14 RX ADMIN — TRIAMCINOLONE ACETONIDE 40 MG: 40 INJECTION, SUSPENSION INTRA-ARTICULAR; INTRAMUSCULAR at 09:01

## 2022-07-14 NOTE — PROGRESS NOTES
Name: Rashawn Miller  YOB: 1970  Gender: female  MRN: [id number]      CC: Shoulder Pain (L shoulder recheck)       HPI: Rashawn Miller is a 49 y.o. female who returns for follow up on her left shoulder. She has been attending physical therapy and is interested in receiving an injection today, which was discussed at her last visit. Physical Examination:  General: no acute distress  Lungs: breathing easily  CV: regular rhythm by pulse  Left Shoulder: Exam is unchanged continues to be painful with glenohumeral provocative maneuvers and still some stiffness        Assessment:     ICD-10-CM    1. Adhesive capsulitis of left shoulder  M75.02 triamcinolone acetonide (KENALOG-40) injection 40 mg     US ARTHR/ASP/INJ MAJOR JNT/BURSA LEFT       Plan:   After informed verbal consent was obtained the left shoulder glenohumeral joint was injected under ultrasound guidance in the longitudinal axis with 6 cc of 0.25% Marcaine and 1 cc of 40 mg Kenalog. Needle was localized to the glenohumeral joint and the capsule was seen to clearly distend. Images were saved to the ultrasound machine. The patient tolerated the injection well and was given postinjection flare precautions    Continue physical therapy see her back in 6 weeks to reevaluate        Heladio Ambrosio MD, [address] [place] [address] and Sports Medicine

## 2022-07-18 ENCOUNTER — APPOINTMENT (OUTPATIENT)
Dept: PHYSICAL THERAPY | Age: 52
End: 2022-07-18
Payer: COMMERCIAL

## 2022-07-21 ENCOUNTER — APPOINTMENT (OUTPATIENT)
Dept: PHYSICAL THERAPY | Age: 52
End: 2022-07-21
Payer: COMMERCIAL

## 2022-08-11 ENCOUNTER — OFFICE VISIT (OUTPATIENT)
Dept: ORTHOPEDIC SURGERY | Age: 52
End: 2022-08-11

## 2022-08-11 DIAGNOSIS — M75.02 ADHESIVE CAPSULITIS OF LEFT SHOULDER: Primary | ICD-10-CM

## 2022-08-11 NOTE — PROGRESS NOTES
Name: Rain Rubio  YOB: 1970  Gender: female  MRN: [id number]      CC: Follow-up (L shoulder recheck)       HPI: Rain Rubio is a 49 y.o. female who returns for follow up on left shoulder pain. She reports getting a significant amount of relief from the glenohumeral injection she received last visit. However, she has noticed that the tightness and discomfort she was experiencing prior is started to return. She reports that she has been unable to attend formal physical therapy since the injection due to circumstances with the birth of her grandson. Physical Examination:  General: no acute distress  Lungs: breathing easily  CV: regular rhythm by pulse  Left Shoulder: No tenderness to palpation. Active forward flexion to 150 degrees limited by pain. External rotation with elbows at side equal bilaterally. Positive Marisol Daub. Abduction to 90 degrees. Overall arc of motion 90 degrees. Assessment:     ICD-10-CM    1. Adhesive capsulitis of left shoulder  M75.02           Plan:   She reports that she got 90% relief from intra-articular steroid injection but was unable to perform physical therapy during this time. I think that overall her range of motion is improving but she would benefit from continued formal physical therapy. Work restrictions will include no overhead use of the left arm no repetitive use of the left arm and she may need accommodations for uniform due to limitation of her shoulder motion limiting dressing. Pato Craft NP dictating as a scribe for MD Harlan Van.  Lou Su MD, [address] [place] [address] and Sports Medicine

## 2022-08-11 NOTE — LETTER
[address],[facility] [address]  Phone: [phone]  Fax: [phone]    Lee Hartley MD        August 11, 2022     Patient: Buzz Balderas   YOB: 1970   Date of Visit: 8/11/2022       To Whom it May Concern:    Chelle Leiva was seen in my clinic on 8/11/2022. She may return to work on Monday, 8/15/22 with the following restrictions:    - No overhead use of the left arm no repetitive use of the left arm   - She may need accommodations for uniform due to limitation of her shoulder motion limiting dressing    If you have any questions or concerns, please don't hesitate to call.     Sincerely,         Lee Hartley MD

## 2022-08-30 ENCOUNTER — HOSPITAL ENCOUNTER (OUTPATIENT)
Dept: PHYSICAL THERAPY | Age: 52
Setting detail: RECURRING SERIES
Discharge: HOME | End: 2022-09-02
Payer: COMMERCIAL

## 2022-08-30 PROCEDURE — 97140 MANUAL THERAPY 1/> REGIONS: CPT

## 2022-08-30 PROCEDURE — 97110 THERAPEUTIC EXERCISES: CPT

## 2022-08-30 ASSESSMENT — PAIN SCALES - GENERAL: PAINLEVEL_OUTOF10: 2

## 2022-08-30 NOTE — PROGRESS NOTES
Anne Marie Javids  : 1970  Primary: Meron Delacruz [facility] *  Secondary:  [address] @ [facility]  [address] [place] [address] [facility] [address]  Phone: [phone]  Fax: [phone] Plan Frequency: 2x/week for 90 days    Plan of Care/Certification Expiration Date: 22      PT Visit Info:   No data recorded    OUTPATIENT PHYSICAL THERAPY:OP NOTE TYPE: Treatment Note 2022       Episode  }Appt Desk              Treatment Diagnosis:  Pain in Left Shoulder (M25.512)  Stiffness of Left Shoulder, Not elsewhere classified (M25.612)  Medical/Referring Diagnosis:  No admission diagnoses are documented for this encounter. Referring Physician:  Giuseppe Hurley MD MD Orders:  PT Eval and Treat   Date of Onset:  Onset Date: 21     Allergies:   Patient has no allergy information on record. Restrictions/Precautions:  No data recordedNo data recorded   Interventions Planned (Treatment may consist of any combination of the following):    Current Treatment Recommendations: Strengthening; ROM; Functional mobility training; ADL/Self-care training; IADL training; Neuromuscular re-education; Manual Therapy - Soft Tissue Mobilization; Manual Therapy - Joint Manipulation; Pain management; Return to work related activity; Home exercise program; Safety education & training; Patient/Caregiver education & training; Modalities; Positioning; Integrated dry needling; Therapeutic activities     Subjective Comments:  Pt reports she has been performing her HEP as she can. She has been out of town due to birth of grandson and complications with his birth  Initial:}    2/10Post Session:       2/10  Medications Last Reviewed:  2022  Updated Objective Findings:  None Today  Treatment   THERAPEUTIC EXERCISE: ( minutes):    Exercises per grid below to improve mobility and strength. Required minimal verbal cues to promote proper body mechanics.   Progressed resistance, range and repetitions as indicated. UBE x 5 min  Pulleys flex and scaption x 5 min-  Wall washing x 10 forward and diagonally  scap retract red TB x 20  Seated t-bar flexion x 15 with stick against back of chair  Pendulum in all directions 2 x 1 min-held  Bicep curls red TB x 20  Shoulder ext x 20  Shoulder rows red TB x 20  Shoulder punches red TB x 20       MANUAL THERAPY: ( minutes):   Joint mobilization and Soft tissue mobilization was utilized and necessary because of the patient's restricted joint motion, loss of articular motion and restricted motion of soft tissue. GHJ mobs post and inferior direction Grade III and IV   Combined with PROM  Treatment/Session Summary:    Treatment Assessment:     Communication/Consultation:  None today  Equipment provided today:  None  Recommendations/Intent for next treatment session: Next visit will focus on continued ROM, manual therapy.     Total Treatment Billable Duration:  45 minutes        Marisa Maravilla PT       Charge Capture  }Post Session Pain  PT Visit Info  [employer] Portal  MD Guidelines  Scanned Media  Benefits  MyChart    Future Appointments   Date Time Provider [facility]   9/2/2022 12:30 PM Marisa Maravilla PT [facility]   9/8/2022 11:45 AM Marisa Maravilla PT [facility]   9/12/2022 12:30 PM Marisa Maravilla PT [facility]   9/15/2022 12:30 PM Marisa Maravilla PT [facility]   9/19/2022 12:30 PM Marisa Maravilla PT [facility]   9/22/2022  8:10 AM Timo Tyler MD [facility] AMB   9/22/2022 12:30 PM Marisa Maravilla PT [facility]

## 2022-08-30 NOTE — PROGRESS NOTES
Jenna Guzman  : 1970  Primary: Rolando Bill [facility] *  Secondary:  [address] @ [facility]  [address] [place] [address]  Phone: [phone]  Fax: [phone] Plan Frequency: 2x/week for 90 days    Plan of Care/Certification Expiration Date: 22      PT Visit Info:   No data recorded    OUTPATIENT PHYSICAL THERAPY:OP NOTE TYPE: Treatment Note 2022       Episode  }Appt Desk              Treatment Diagnosis:  Pain in Left Shoulder (M25.512)  Stiffness of Left Shoulder, Not elsewhere classified (M25.612)  Medical/Referring Diagnosis:  No admission diagnoses are documented for this encounter. Referring Physician:  Abi Franklin MD MD Orders:  PT Eval and Treat   Date of Onset:  Onset Date: 21     Allergies:   Patient has no allergy information on record. Restrictions/Precautions:  No data recordedNo data recorded   Interventions Planned (Treatment may consist of any combination of the following):    Current Treatment Recommendations: Strengthening; ROM; Functional mobility training; ADL/Self-care training; IADL training; Neuromuscular re-education; Manual Therapy - Soft Tissue Mobilization; Manual Therapy - Joint Manipulation; Pain management; Return to work related activity; Home exercise program; Safety education & training; Patient/Caregiver education & training; Modalities; Positioning; Integrated dry needling; Therapeutic activities     Subjective Comments:  Pt reports she has been performing her HEP as she can. She has been out of town due to birth of grandson and complications with his birth  Initial:}    2/10Post Session:       2/10  Medications Last Reviewed:  2022  Updated Objective Findings:  None Today  Treatment   THERAPEUTIC EXERCISE: (25 minutes):    Exercises per grid below to improve mobility and strength. Required minimal verbal cues to promote proper body mechanics.   Progressed resistance, range and repetitions as

## 2022-09-02 ENCOUNTER — HOSPITAL ENCOUNTER (OUTPATIENT)
Dept: PHYSICAL THERAPY | Age: 52
Setting detail: RECURRING SERIES
Discharge: HOME | End: 2022-09-05
Payer: COMMERCIAL

## 2022-09-02 PROCEDURE — 97110 THERAPEUTIC EXERCISES: CPT

## 2022-09-02 PROCEDURE — 97140 MANUAL THERAPY 1/> REGIONS: CPT

## 2022-09-02 ASSESSMENT — PAIN SCALES - GENERAL: PAINLEVEL_OUTOF10: 2

## 2022-09-02 NOTE — PROGRESS NOTES
Carolina Marrero  : 1970  Primary: Celena Mirza [facility] *  Secondary:  [address] @ [facility]  [address] [place] [address] Efrain Capone [place] [address]  Phone: [phone]  Fax: [phone] Plan Frequency: 2x/week for 90 days    Plan of Care/Certification Expiration Date: 22      PT Visit Info:   No data recorded    OUTPATIENT PHYSICAL THERAPY:OP NOTE TYPE: Treatment Note 2022       Episode  }Appt Desk              Treatment Diagnosis:  Pain in Left Shoulder (M25.512)  Stiffness of Left Shoulder, Not elsewhere classified (M25.612)  Medical/Referring Diagnosis:  No admission diagnoses are documented for this encounter. Referring Physician:  Sarmad Cox MD MD Orders:  PT Eval and Treat   Date of Onset:  Onset Date: 21     Allergies:   Patient has no allergy information on record. Restrictions/Precautions:  No data recordedNo data recorded   Interventions Planned (Treatment may consist of any combination of the following):    Current Treatment Recommendations: Strengthening; ROM; Functional mobility training; ADL/Self-care training; IADL training; Neuromuscular re-education; Manual Therapy - Soft Tissue Mobilization; Manual Therapy - Joint Manipulation; Pain management; Return to work related activity; Home exercise program; Safety education & training; Patient/Caregiver education & training; Modalities; Positioning; Integrated dry needling; Therapeutic activities     Subjective Comments:  Pt reports feeling like she is improving. Difficulty with reaching behind her back  Initial:}    2/10Post Session:       2/10  Medications Last Reviewed:  2022  Updated Objective Findings:  None Today  Treatment   THERAPEUTIC EXERCISE: (25 minutes):    Exercises per grid below to improve mobility and strength. Required minimal verbal cues to promote proper body mechanics. Progressed resistance, range and repetitions as indicated.   UBE x 4 revolutions with assistance  Pulleys flex and scaption x 2 min  Wall washing x 20 forward and diagonally  scap retract red TB x 20-held   t-bar flexion against wall x 20 Punches green TB x 20  Rows green TB x 20  IR with strap x 10 hold 5 sec  Resisted shoulder flexion against wall yellow TB x 10      MANUAL THERAPY: (15 minutes):   Joint mobilization and Soft tissue mobilization was utilized and necessary because of the patient's restricted joint motion, loss of articular motion and restricted motion of soft tissue. GHJ mobs post and inferior direction Grade III and IV   Combined with PROM  Scapular mobs inferior/superior and upwards rotation with pt in sidelying  Treatment/Session Summary:    Treatment Assessment:  Pt responding well to joint mobs and is gaining IR motion with manual work. Full ER noted today-passively  Communication/Consultation:  None today  Equipment provided today:  None  Recommendations/Intent for next treatment session: Next visit will focus on continued ROM, manual therapy.     Total Treatment Billable Duration:  45 minutes   Time In: 1906  Time Out: [address], PT       Charge Capture  }Post Session Pain  PT Visit Info  MedBridge Portal  MD Guidelines  Scanned Media  Benefits  MyChart    Future Appointments   Date Time Provider Gayle Devlin   9/8/2022 11:45 AM Natalieetienne Osman, PT [facility]   9/12/2022 12:30 PM Natalie Means, PT [facility]   9/15/2022 12:30 PM Natalie Means, PT [facility]   9/19/2022 12:30 PM Natalie Means, PT [facility]   9/22/2022  8:10 AM Lily Hobbs MD [facility]   9/22/2022 12:30 PM Natalie Means, PT [facility]

## 2022-09-08 ENCOUNTER — HOSPITAL ENCOUNTER (OUTPATIENT)
Dept: PHYSICAL THERAPY | Age: 52
Setting detail: RECURRING SERIES
Discharge: HOME | End: 2022-09-11
Payer: COMMERCIAL

## 2022-09-08 PROCEDURE — 97110 THERAPEUTIC EXERCISES: CPT

## 2022-09-08 PROCEDURE — 97140 MANUAL THERAPY 1/> REGIONS: CPT

## 2022-09-08 ASSESSMENT — PAIN SCALES - GENERAL: PAINLEVEL_OUTOF10: 4

## 2022-09-08 NOTE — PROGRESS NOTES
Oanh Sanchez  : 1970  Primary: [facility] *  Secondary:  [address] @ [facility]  [address] [facility] Jerson Perez [place] [address]  Phone: [phone]  Fax: [phone] Plan Frequency: 2x/week for 90 days    Plan of Care/Certification Expiration Date: 22      PT Visit Info:   No data recorded    OUTPATIENT PHYSICAL THERAPY:OP NOTE TYPE: Treatment Note 2022       Episode  }Appt Desk              Treatment Diagnosis:  Pain in Left Shoulder (M25.512)  Stiffness of Left Shoulder, Not elsewhere classified (M25.612)  Medical/Referring Diagnosis:  No admission diagnoses are documented for this encounter. Referring Physician:  Pauline Meigs, MD MD Orders:  PT Eval and Treat   Date of Onset:  Onset Date: 21     Allergies:   Patient has no allergy information on record. Restrictions/Precautions:  No data recordedNo data recorded   Interventions Planned (Treatment may consist of any combination of the following):    Current Treatment Recommendations: Strengthening; ROM; Functional mobility training; ADL/Self-care training; IADL training; Neuromuscular re-education; Manual Therapy - Soft Tissue Mobilization; Manual Therapy - Joint Manipulation; Pain management; Return to work related activity; Home exercise program; Safety education & training; Patient/Caregiver education & training; Modalities; Positioning; Integrated dry needling; Therapeutic activities     Subjective Comments:  Pt having more soreness in upper trap as well as shoulder blade region  Initial:}    4/10Post Session:       4/10  Medications Last Reviewed:  2022  Updated Objective Findings:  None Today  Treatment   THERAPEUTIC EXERCISE: (30 minutes):    Exercises per grid below to improve mobility and strength. Required minimal verbal cues to promote proper body mechanics. Progressed resistance, range and repetitions as indicated.   UBE x 6 min  Pulleys flex and scaption x 2 min-held  Pulleys IR x 15  Wall washing x 20 forward and diagonally  scap retract red TB x 20-held   t-bar flexion against wall x 20   Punches green TB x 20-held  Rows green TB x 20  Resisted shoulder flexion against wall yellow TB x 10-held  Scaption 1# x 20  Shoulder flexion to 90 1# x 20  Sidelying ER 1# x 20      MANUAL THERAPY: (15 minutes):   Joint mobilization and Soft tissue mobilization was utilized and necessary because of the patient's restricted joint motion, loss of articular motion and restricted motion of soft tissue. GHJ mobs post and inferior direction Grade III and IV   Combined with PROM  Scapular mobs inferior/superior and upwards rotation with pt in sidelying  Treatment/Session Summary:    Treatment Assessment:  Pt having increased pulling with IR motions today and flexion. She was able to complete new exercises of scaption and flexion with 1 pound weight. Passively she has full elevation and has moderate IR motion, but actively she is still hypomobile  Communication/Consultation:  None today  Equipment provided today:  None  Recommendations/Intent for next treatment session: Next visit will focus on continued ROM, manual therapy.     Total Treatment Billable Duration:  45 minutes   Time In: 0545  Time Out: [address], PT       Charge Capture  }Post Session Pain  PT Visit Info  [employer] Portal  MD Guidelines  Scanned Media  Benefits  MyChart    Future Appointments   Date Time Provider Gayle Dixonisti   9/12/2022 12:30 PM Iggy Hubbard PT [facility]   9/15/2022 12:30 PM SHAMEKA [facility]   9/19/2022 12:30 PM Iggy Hubbard PT [facility]   9/22/2022  8:10 AM Margret Rangel MD [facility]   9/22/2022 12:30 PM Iggy Hubbard PT [facility]

## 2022-09-12 ENCOUNTER — HOSPITAL ENCOUNTER (OUTPATIENT)
Dept: PHYSICAL THERAPY | Age: 52
Setting detail: RECURRING SERIES
End: 2022-09-12
Payer: COMMERCIAL

## 2022-09-12 NOTE — PROGRESS NOTES
Belinda Gonsalez  : 1970  Primary: [facility] *  Secondary:  [address] @ [facility]  [address] Ghada Carranza [place] [address]  Phone: [phone]  Fax: [phone] Plan Frequency: 2x/week for 90 days    Plan of Care/Certification Expiration Date: 22      PT Visit Info:  No data recorded       OUTPATIENT PHYSICAL THERAPY 2022     Appt Desk   Episode   MyChart      Pt called to cancel appointment today due to illness.     Future Appointments   Date Time Provider Gayle Devlin   2022 12:30 PM Bethany Martinez, PT [facility]   9/15/2022 12:30 PM Bethany Martinez PT [facility]   2022 12:30 PM Bethany Martinez PT [facility]   2022  8:10 AM Balbir Hurt MD [facility]   2022 12:30 PM Bethany Martinez PT [facility]

## 2022-10-03 ENCOUNTER — PATIENT MESSAGE (OUTPATIENT)
Dept: ORTHOPEDIC SURGERY | Age: 52
End: 2022-10-03

## 2022-10-18 ENCOUNTER — OFFICE VISIT (OUTPATIENT)
Dept: ORTHOPEDIC SURGERY | Age: 52
End: 2022-10-18

## 2022-10-18 DIAGNOSIS — M75.02 ADHESIVE CAPSULITIS OF LEFT SHOULDER: ICD-10-CM

## 2022-10-18 DIAGNOSIS — M75.22 BICEPS TENDINITIS OF LEFT SHOULDER: Primary | ICD-10-CM

## 2022-10-18 DIAGNOSIS — M19.012 ARTHRITIS OF LEFT ACROMIOCLAVICULAR JOINT: ICD-10-CM

## 2022-10-18 DIAGNOSIS — M75.42 IMPINGEMENT SYNDROME OF LEFT SHOULDER: ICD-10-CM

## 2022-10-18 PROCEDURE — 99214 OFFICE O/P EST MOD 30 MIN: CPT | Performed by: ORTHOPAEDIC SURGERY

## 2022-10-18 NOTE — PROGRESS NOTES
Name: Samantha Cerna  YOB: 1970  Gender: female  MRN: [id number]      CC: Follow-up (L shoulder recheck)       HPI: Samantha Cerna is a 47 y.o. female who returns for follow up on left shoulder pain. She is well but continues to have pain and weakness. She has attended a few sessions of formal physical therapy but has not been consistent due to complications with her new grandson. She continues to complain of severe pain with overhead activity or any increase use of the shoulder. Her symptoms have now been going on for approximately 1 year. Physical Examination:  General: no acute distress  Lungs: breathing easily  CV: regular rhythm by pulse  Left Shoulder: Tenderness palpation over the biceps anteriorly. Tenderness palpation of the AC joint. Pain with crossarm adduction over the [facility] joint. Pain with speeds pain with [place]'s. Active elevation has improved up to about 150 passively I can take her to about 160. Rotation at her side has improved as well to about 45 degrees. She has pain with [place]'s and [place]'s. She appears to have appropriate resisted rotator cuff strength and empty can internal and external rotation positions although painful. Assessment:     ICD-10-CM    1. Biceps tendinitis of left shoulder  M75.22       2. Adhesive capsulitis of left shoulder  M75.02       3. Impingement syndrome of left shoulder  M75.42       4. Arthritis of left acromioclavicular joint  M19.012           Plan:   Continued shoulder pain after an acute injury. She is exhausted conservative management with multiple injections physical therapy home exercise program and activity modification. We discussed potentially a biceps tendon sheath injection versus a repeat intra-articular or subacromial injection and continued physical therapy versus consideration of surgical intervention.   She has a very painful shoulder which has failed to improve despite all these conservative measures I think it is appropriate to consider surgical treatment at this point which would include shoulder arthroscopy biceps tenotomy, capsular release, subacromial decompression and distal clavicle resection. If there was a surprise of rotator cuff tear at the time of surgery we would repair that as well though I think this is unlikely. Reviewed the details risk and benefits of the procedure and the postoperative course associated with that which likely would be 2 to 3 weeks in a sling with progression as tolerated. I think she can go back to work with some restrictions hopefully at 4 to 6 weeks with likely a 4-month return to [address] [place] [address] depending on her progress postoperatively. All of her questions have been answered she prefers to proceed with surgery as planned. Surgical plan to be for left shoulder arthroscopy biceps tenotomy, capsular release, subacromial decompression and distal clavicle resection            Heladio Sy MD, [address] [place] [address] and Sports Medicine

## 2022-10-20 PROBLEM — M75.22 BICEPS TENDINITIS OF LEFT SHOULDER: Status: ACTIVE | Noted: 2022-10-20

## 2022-10-20 PROBLEM — M75.42 IMPINGEMENT SYNDROME OF LEFT SHOULDER: Status: ACTIVE | Noted: 2022-10-20

## 2022-10-20 PROBLEM — M19.012 ARTHRITIS OF LEFT ACROMIOCLAVICULAR JOINT: Status: ACTIVE | Noted: 2022-10-20

## 2022-10-21 RX ORDER — DICLOFENAC SODIUM 75 MG/1
TABLET, DELAYED RELEASE ORAL
Qty: 60 TABLET | Refills: 1 | Status: SHIPPED | OUTPATIENT
Start: 2022-10-21

## 2022-11-08 ENCOUNTER — TELEPHONE (OUTPATIENT)
Dept: ORTHOPEDIC SURGERY | Age: 52
End: 2022-11-08

## 2022-11-08 NOTE — TELEPHONE ENCOUNTER
I called the  on the file and was transferred to Dianah Carrel who is the adjustor on the file who would be approving/denying surgery. I left a voicemail for him at [phone]. I also will follow-up with an email to Mr. Devan Cedeño seeking authorization. His email is [email or web address]

## 2022-11-16 ENCOUNTER — TELEPHONE (OUTPATIENT)
Dept: ORTHOPEDIC SURGERY | Age: 52
End: 2022-11-16

## 2022-11-16 NOTE — TELEPHONE ENCOUNTER
Left message for Sherrie Schuster @ [facility] @ [phone] checking on the status of surgery request.

## 2022-11-26 ENCOUNTER — HOSPITAL ENCOUNTER (EMERGENCY)
Age: 52
Discharge: HOME | End: 2022-11-26
Attending: EMERGENCY MEDICINE

## 2022-11-26 VITALS
SYSTOLIC BLOOD PRESSURE: 138 MMHG | OXYGEN SATURATION: 96 % | HEIGHT: 65 IN | HEART RATE: 94 BPM | DIASTOLIC BLOOD PRESSURE: 79 MMHG | TEMPERATURE: 97.9 F | BODY MASS INDEX: 30.82 KG/M2 | RESPIRATION RATE: 20 BRPM | WEIGHT: 185 LBS

## 2022-11-26 DIAGNOSIS — J06.9 VIRAL URI: Primary | ICD-10-CM

## 2022-11-26 LAB
FLUAV AG NPH QL IA: NEGATIVE
FLUBV AG NPH QL IA: NEGATIVE
SPECIMEN SOURCE: NORMAL
STREP, MOLECULAR: NOT DETECTED

## 2022-11-26 PROCEDURE — 87651 STREP A DNA AMP PROBE: CPT

## 2022-11-26 PROCEDURE — 99283 EMERGENCY DEPT VISIT LOW MDM: CPT | Performed by: PHYSICIAN ASSISTANT

## 2022-11-26 PROCEDURE — 87804 INFLUENZA ASSAY W/OPTIC: CPT

## 2022-11-26 RX ORDER — SEMAGLUTIDE 1.34 MG/ML
INJECTION, SOLUTION SUBCUTANEOUS
COMMUNITY
Start: 2022-04-28 | End: 2022-12-02 | Stop reason: ALTCHOICE

## 2022-11-26 ASSESSMENT — PAIN - FUNCTIONAL ASSESSMENT: PAIN_FUNCTIONAL_ASSESSMENT: 0-10

## 2022-11-26 ASSESSMENT — ENCOUNTER SYMPTOMS
SORE THROAT: 1
SINUS PRESSURE: 1
NAUSEA: 0
ABDOMINAL PAIN: 0
VOMITING: 0
RHINORRHEA: 0
COUGH: 0
SHORTNESS OF BREATH: 0

## 2022-11-26 ASSESSMENT — PAIN SCALES - GENERAL: PAINLEVEL_OUTOF10: 5

## 2022-11-26 ASSESSMENT — PAIN DESCRIPTION - LOCATION: LOCATION: HEAD

## 2022-11-26 NOTE — DISCHARGE INSTRUCTIONS
Flu and strep negative. Likely viral URI. OTC agents, decongestants, nasal spray, nasal rinse, etc for symptom relief. Follow-up with family doctor. Return to ED as needed.

## 2022-11-26 NOTE — ED TRIAGE NOTES
Pt reports that she took the flu shot on Tuesday and has had a fever intermittently since then. C/o of sore throat and sinus pressure.

## 2022-11-26 NOTE — ED NOTES
I have reviewed discharge instructions with the patient. The patient verbalized understanding. Patient left ED via Discharge Method: ambulatory to Home with self. Opportunity for questions and clarification provided. Patient given 0 scripts. No esign      To continue your aftercare when you leave the hospital, you may receive an automated call from our care team to check in on how you are doing. This is a free service and part of our promise to provide the best care and service to meet your aftercare needs.  If you have questions, or wish to unsubscribe from this service please call [phone]. Thank you for Choosing our [facility] Emergency Department.       Brant De La Rosa RN  11/26/22 1123

## 2022-11-26 NOTE — ED PROVIDER NOTES
Emergency Department Provider Note                   PCP:                Radha Woo MD               Age: 47 y.o. Sex: female       ICD-10-CM    1. Viral URI  J06.9           DISPOSITION Decision To Discharge 11/26/2022 07:31:39 AM        MDM  Number of Diagnoses or Management Options  Viral URI  Diagnosis management comments: Patient is a 72-year-old female who started with 2 days of upper respiratory symptoms. On exam patient is well-appearing in no acute distress. Patient initially tachycardic on arrival with resolution without intervention by time of discharge. Flu and strep negative. Patient had a negative COVID at home earlier today. Discussed the likely viral cause and possible 5 to 7-day duration of symptoms. Encouraged sinus rinses, nasal sprays, decongestants, etc. for symptom relief as needed. Plenty of fluids and rest.  Follow-up with family doctor as needed. Return to the ED as necessary for new or worsening symptoms. Patient reports understanding of the findings here today as well as the treatment plan discussed. Patient ambulatory upon discharge in stable condition at this time. Amount and/or Complexity of Data Reviewed  Clinical lab tests: reviewed and ordered  Tests in the medicine section of CPT®: reviewed and ordered  Review and summarize past medical records: yes  Independent visualization of images, tracings, or specimens: yes    Risk of Complications, Morbidity, and/or Mortality  Presenting problems: low  Diagnostic procedures: low  Management options: low  General comments: The patient was observed in the ED.     Results Reviewed:      Recent Results (from the past 24 hour(s))  -Rapid influenza A/B antigens:   Collection Time: 11/26/22  5:56 AM  Specimen: Nasal Washing       Result                      Value             Ref Range           Influenza A Ag              Negative          NEG                 Influenza B Ag              Negative          NEG Source                                                        Nasopharyngeal  -Group A Strep Screen By PCR:   Collection Time: 11/26/22  5:57 AM  Specimen: Swab       Result                      Value             Ref Range           Strep, Molecular            Not detected      NOTD             I discussed the results of all labs, procedures, radiographs, and treatments with the patient and available family. Treatment plan is agreed upon and the patient is ready for discharge. All voiced understanding of the discharge plan and medication instructions or changes as appropriate. Questions about treatment in the ED were answered. All were encouraged to return should symptoms worsen or new problems develop. Patient Progress  Patient progress: stable             Orders Placed This Encounter   Procedures    Rapid influenza A/B antigens    Group A Strep Screen By PCR        Medications - No data to display    New Prescriptions    No medications on file        Kaynest Santos is a 47 y.o. female who presents to the Emergency Department with chief complaint of    Chief Complaint   Patient presents with    Pharyngitis      Patient is an otherwise healthy 60-year-old female who presents to facility today after getting her flu shot on Tuesday and since then has had intermittent fevers, sore throat, and sinus pressure. She states she is unsure if she is just experiencing the symptoms because of a shot or if she actually has developed the flu or potentially another type of infection. She states she did a COVID test at home and it was negative. She states her highest temperature has been 102 °F which was this morning approximately an hour and a half ago before she took some Tylenol. She states she feels karate but figured she would come in early and be evaluated and get tested and decide where to go from there.   She denies headache, ear pain, dizziness, syncope, chest pain, cough, shortness of breath, abdominal pain, nausea, vomiting, diarrhea. The history is provided by the patient. Review of Systems   Constitutional:  Positive for fever. Negative for chills. HENT:  Positive for congestion, sinus pressure and sore throat. Negative for rhinorrhea. Respiratory:  Negative for cough and shortness of breath. Cardiovascular:  Negative for chest pain. Gastrointestinal:  Negative for abdominal pain, nausea and vomiting. Genitourinary:  Negative for dysuria, frequency and urgency. Musculoskeletal:  Negative for gait problem. Skin:  Negative for rash. Neurological:  Negative for dizziness, syncope and headaches. Psychiatric/Behavioral:  Negative for agitation and behavioral problems. All other systems reviewed and are negative. Past Medical History:   Diagnosis Date    Morbid obesity with BMI of 45.0-49.9, adult ([address]     Tachycardia         History reviewed. No pertinent surgical history. History reviewed. No pertinent family history. Social History     Socioeconomic History    Marital status:      Spouse name: None    Number of children: None    Years of education: None    Highest education level: None   Tobacco Use    Smoking status: Every Day     Packs/day: 0.50     Types: Cigarettes    Smokeless tobacco: Never    Tobacco comments:     Quit smoking: quit 2011   Substance and Sexual Activity    Alcohol use: No    Drug use: No         Patient has no known allergies.      Previous Medications    DICLOFENAC (VOLTAREN) 75 MG EC TABLET    TAKE 1 TABLET BY MOUTH TWICE DAILY    FLUTICASONE (FLONASE) 50 MCG/ACT NASAL SPRAY    2 sprays by Nasal route daily    IBUPROFEN (ADVIL;MOTRIN) 600 MG TABLET    Take 600 mg by mouth 2 times daily as needed    LORATADINE-PSEUDOEPHEDRINE (CLARITIN-D 24HR)  MG PER EXTENDED RELEASE TABLET    Take 1 tablet by mouth daily    SEMAGLUTIDE,0.25 OR 0.5MG/DOS, (OZEMPIC, 0.25 OR 0.5 MG/DOSE,) 2 MG/1.5ML SOPN            Vitals signs and nursing note reviewed. Patient Vitals for the past 4 hrs:   Temp Pulse Resp BP SpO2   11/26/22 0731 -- 94 -- -- --   11/26/22 0551 97.9 °F (36.6 °C) (!) 121 20 138/79 96 %          Physical Exam  Vitals and nursing note reviewed. Constitutional:       General: She is not in acute distress. Appearance: Normal appearance. She is not ill-appearing. HENT:      Head: Normocephalic and atraumatic. Right Ear: Tympanic membrane, ear canal and external ear normal.      Left Ear: Tympanic membrane, ear canal and external ear normal.      Mouth/Throat:      Mouth: Mucous membranes are moist.      Pharynx: Oropharynx is clear. Uvula midline. Posterior oropharyngeal erythema present. No pharyngeal swelling, oropharyngeal exudate or uvula swelling. Eyes:      Extraocular Movements: Extraocular movements intact. Conjunctiva/sclera: Conjunctivae normal.   Cardiovascular:      Rate and Rhythm: Normal rate and regular rhythm. Pulses: Normal pulses. Heart sounds: Normal heart sounds. Pulmonary:      Effort: Pulmonary effort is normal.      Breath sounds: Normal breath sounds. Abdominal:      General: Abdomen is flat. Palpations: Abdomen is soft. Musculoskeletal:         General: Normal range of motion. Cervical back: Normal range of motion. Skin:     General: Skin is warm and dry. Capillary Refill: Capillary refill takes less than 2 seconds. Neurological:      General: No focal deficit present. Mental Status: She is alert and oriented to person, place, and time.    Psychiatric:         Mood and Affect: Mood normal.         Behavior: Behavior normal.        Procedures    Results for orders placed or performed during the hospital encounter of 11/26/22   Rapid influenza A/B antigens    Specimen: Nasal Washing   Result Value Ref Range    Influenza A Ag Negative NEG      Influenza B Ag Negative NEG      Source Nasopharyngeal     Group A Strep Screen By PCR    Specimen: Swab   Result Value Ref Range    Strep, Molecular Not detected NOTD          No orders to display                       Voice dictation software was used during the making of this note. This software is not perfect and grammatical and other typographical errors may be present. This note has not been completely proofread for errors.      Jimmie Greco PA-C  11/26/22 3433

## 2022-11-29 DIAGNOSIS — M75.42 IMPINGEMENT SYNDROME OF LEFT SHOULDER: ICD-10-CM

## 2022-11-29 DIAGNOSIS — M75.22 BICEPS TENDINITIS OF LEFT SHOULDER: Primary | ICD-10-CM

## 2022-11-29 DIAGNOSIS — M25.512 LEFT SHOULDER PAIN, UNSPECIFIED CHRONICITY: ICD-10-CM

## 2022-11-29 DIAGNOSIS — M19.012 ARTHRITIS OF LEFT ACROMIOCLAVICULAR JOINT: ICD-10-CM

## 2022-11-29 DIAGNOSIS — M75.02 ADHESIVE CAPSULITIS OF LEFT SHOULDER: ICD-10-CM

## 2022-12-02 ENCOUNTER — TELEPHONE (OUTPATIENT)
Dept: ORTHOPEDIC SURGERY | Age: 52
End: 2022-12-02

## 2022-12-02 ENCOUNTER — OFFICE VISIT (OUTPATIENT)
Dept: OCCUPATIONAL MEDICINE | Age: 52
End: 2022-12-02

## 2022-12-02 ENCOUNTER — OFFICE VISIT (OUTPATIENT)
Dept: ORTHOPEDIC SURGERY | Age: 52
End: 2022-12-02

## 2022-12-02 VITALS
DIASTOLIC BLOOD PRESSURE: 84 MMHG | RESPIRATION RATE: 14 BRPM | HEART RATE: 100 BPM | OXYGEN SATURATION: 96 % | TEMPERATURE: 98.3 F | SYSTOLIC BLOOD PRESSURE: 122 MMHG

## 2022-12-02 DIAGNOSIS — H65.92 LEFT NON-SUPPURATIVE OTITIS MEDIA: Primary | ICD-10-CM

## 2022-12-02 DIAGNOSIS — M75.42 IMPINGEMENT SYNDROME OF LEFT SHOULDER: ICD-10-CM

## 2022-12-02 DIAGNOSIS — J01.10 ACUTE FRONTAL SINUSITIS, RECURRENCE NOT SPECIFIED: ICD-10-CM

## 2022-12-02 DIAGNOSIS — M75.22 BICEPS TENDINITIS OF LEFT SHOULDER: ICD-10-CM

## 2022-12-02 DIAGNOSIS — H65.01 RIGHT ACUTE SEROUS OTITIS MEDIA, RECURRENCE NOT SPECIFIED: ICD-10-CM

## 2022-12-02 DIAGNOSIS — M19.012 ARTHRITIS OF LEFT ACROMIOCLAVICULAR JOINT: ICD-10-CM

## 2022-12-02 DIAGNOSIS — M75.02 ADHESIVE CAPSULITIS OF LEFT SHOULDER: Primary | ICD-10-CM

## 2022-12-02 RX ORDER — TIRZEPATIDE 2.5 MG/.5ML
2.5 INJECTION, SOLUTION SUBCUTANEOUS WEEKLY
COMMUNITY

## 2022-12-02 RX ORDER — AMOXICILLIN AND CLAVULANATE POTASSIUM 875; 125 MG/1; MG/1
1 TABLET, FILM COATED ORAL 2 TIMES DAILY
Qty: 20 TABLET | Refills: 0 | Status: SHIPPED | OUTPATIENT
Start: 2022-12-02 | End: 2022-12-12

## 2022-12-02 RX ORDER — OXYCODONE HYDROCHLORIDE 5 MG/1
5 TABLET ORAL EVERY 4 HOURS PRN
Qty: 30 TABLET | Refills: 0 | Status: SHIPPED | OUTPATIENT
Start: 2022-12-02 | End: 2022-12-07

## 2022-12-02 RX ORDER — ONDANSETRON 4 MG/1
4 TABLET, FILM COATED ORAL DAILY PRN
Qty: 30 TABLET | Refills: 0 | Status: SHIPPED | OUTPATIENT
Start: 2022-12-02

## 2022-12-02 ASSESSMENT — ENCOUNTER SYMPTOMS
RHINORRHEA: 1
SINUS PAIN: 1
RESPIRATORY NEGATIVE: 1
COUGH: 0
SORE THROAT: 0

## 2022-12-02 NOTE — LETTER
DME Patient Authorization Form    Name: Nuno Yu  : 1970  MRN: [id number]   Age: 47 y.o. Gender: female  Delivery Address: [facility]     Diagnosis:     ICD-10-CM    1. Adhesive capsulitis of left shoulder  M75.02 Arm Sling ()     Ambulatory referral to Physical Therapy      2. Impingement syndrome of left shoulder  M75.42 Arm Sling ()     Ambulatory referral to Physical Therapy      3. Biceps tendinitis of left shoulder  M75.22 Arm Sling ()     Ambulatory referral to Physical Therapy      4. Arthritis of left acromioclavicular joint  M19.012 Arm Sling ()     Ambulatory referral to Physical Therapy           Requested DME:  Arm Sling**- ($25.00) X 1 - left        Clinical Notes:     **Indicates non-covered items by insurance. Payment expected on date of service. Electronically signed by  Provider: SELENE Williamson - CNP__Date: 2022                            [facility]/[address] Tax ID # [id number]        Durable Medical Equipment and/or Orthotics Patient Consent     I understand that my physician has prescribed this medical supply as part of my treatment plan as a matter of Medical Necessity.  I understand that I have a choice in where I receive my prescribed orthopedic supplies and/or services.  I authorize [facility] to furnish this service/product and to provide my insurance carrier with any information requested in order to process for payment.  I instruct my insurance carrier to pay [facility] directly for these services/products.  I understand that my insurance carrier may deny payment for this supply because it is a non-covered item, deemed not medically necessary or considered experimental.   I understand that any cost not covered by my insurance carrier will be solely my financial responsibility.    I have received the Supplier Standards and have reviewed them.  I have received the prescribed item and have been fully instructed on the proper use of the above services/products.    ______ (Patient Initials) I understand that all DME items are non-returnable after being dispensed. Items still in sealed packaging may be returned up to 14 days after purchasing. [address] will replace items that are defective.    ______ (Patient Initials) I understand that [facility] will not file a claim with my insurance carrier for this service/product and I am waiving my right to file a claim on my own for this service/product with my insurance company as this item is NON-COVERED (Denoted by the **) by my Insurance company/policy. ______ (Patient Initials) I understand that I am responsible to bring my equipment to the hospital for any surgery. ______________________________________________  _______________________  Patient / Malaysian Payton            Thank you for considering [address] Your physician has prescribed specific medical equipment or devices for your home use. The following describes your rights and responsibilities as our customer. Right to Choose Providers: You have a choice regarding which company supplies your home medical equipment and devices, and to consult your physician in this decision. You may choose a medical supply store, a home medical equipment provider, or a specialist such as [facility]. [facility] will coordinate with your physician to provide the medical equipment or devices prescribed for your home use. Right to Service:  You have the right to considerate, respectful and nondiscriminatory care. You have the right to receive accurate and easily understood information about your health care.   If you speak a foreign language, or don't understand the discussions, assistance will be provided to allow you to make informed health care decisions. You have the right to know your treatment options and to participate in decisions about your care, including the right to accept or refuse treatment. You have the right to expect a reasonable response to your requests for treatment or service. You have the right to talk in confidence with health care providers and to have your health care information protected. You have the right to receive an explanation of your bill. You have the right to complain about the service or product you receive. Patient Responsibilities:  Please provide complete and accurate information about your health insurance benefits and make arrangements for the timely payment of your bill. [facility] will, if possible, assume responsibility for billing your insurance (Medicare, Medicaid and commercial) for the prescribed equipment or devices. If your policy does not cover the prescribed product, or only covers a portion of the bill, you are responsible for any remaining balance. Return and Exchange Policy:  [facility] will honor published  Warranties for products. [facility] will accept returns or exchanges within 14 days from the date of receipt, providin) the product must be in new condition; 2) receipt as required; and 3) used disposable and hygiene products may only be returned due to a defective product. Note: Refunds will be issued in a timely manner, please allow 4-6 weeks for processing. Complaint Procedures and DME Consumer Protection Resources:  [facility] values you as a customer, and is committed to resolving patient concerns. This commitment includes understanding and documenting your concerns, conducting a review of internal procedures, and providing you with an explanation and resolution to your concerns.   Should you have any questions about our services or billing process, please contact our office at (practice phone number). If we are unable to resolve the concern, you have the right to direct comments to the office of Consumer Protection, in the [address] or the [facility] office, without fear of repercussion. DMEPOS SUPPLIER STANDARDS    A supplier must be in compliance with all applicable Federal and [facility] and regulatory requirements. A supplier must provide complete and accurate information on the DMEPOS supplier application. Any changes to this information must be reported to the [facility] within 30 days. An authorized individual (one whose signature is binding) must sign the application for billing privileges. A supplier must fill orders from its own inventory, or must contract with other companies for the purchase of items necessary to fill the order. A supplier may not contract with any entity that is currently excluded from the Medicare program, any [facility] program, or from any other Federal procurement or Nonprocurement programs. A supplier must advise beneficiaries that they may rent or purchase inexpensive or routinely purchased durable medical equipment, and of the purchase option for capped rental equipment. A supplier must notify beneficiaries of warranty coverage and honor all warranties under applicable State Law, and repair or replace free of charge Medicare covered items that are under warranty. A supplier must maintain a physical facility on an appropriate site. A supplier must permit CMS, or its agents to conduct on-site inspections to ascertain the supplier's compliance with these standards. The supplier location must be accessible to beneficiaries during reasonable business hours, and must maintain a visible sign and posted hours of operation.   A supplier must maintain a primary business telephone listed under the name of the business in a [employer] or a toll free number available through [employer] assistance. The exclusive use of a beeper, answering machine or cell phone is prohibited. A supplier must have comprehensive liability insurance in the amount of at least $300,000 that covers both the supplier's place of business and all customers and employees of the supplier. If the supplier manufactures its own items, this insurance must also cover product liability and completed operations. A supplier must agree not to initiate telephone contact with beneficiaries, with a few exceptions allowed. This standard prohibits suppliers from calling beneficiaries in order to solicit new business. A supplier is responsible for delivery and must instruct beneficiaries on use of Medicare covered items, and maintain proof of delivery. A supplier must answer questions, and respond to complaints of the beneficiaries, and maintain documentation of such contacts. A supplier must maintain and replace at no charge or repair directly, or through a service contract with another company, Medicare covered items it has rented to beneficiaries. A supplier must accept returns of substandard (less than full quality for the particular item) or unsuitable items (inappropriate for the beneficiary at the time it was fitted and rented or sold) from beneficiaries. A supplier must disclose these supplier standards to each beneficiary to whom it supplies a Medicare-covered item. A supplier must disclose to the government any person having ownership, financial, or control interest in the supplier. A supplier must not convey or reassign a supplier number; i.e., the supplier may not sell or allow another entity to use its Medicare billing number. A supplier must have a complaint resolution protocol established to address beneficiary complaints that relate to these standards. A record of these complaints must be maintained at the physical facility.   Complaint records must include: the name, address, telephone number and health insurance claim number of the beneficiary, a summary of the complaint, and any action taken to resolve it. A supplier must agree to furnish CMS any information required by the Medicare statute and implementing regulations. A supplier of DMEPOS and other items and services must be accredited by a CMS-approved accreditation organization in order to receive and retain a supplier billing number. The accreditation must indicate the specific products and services, for which the supplier is accredited in order for the supplier to receive payment for those specific products and services. A DMEPOS supplier must notify their accreditation organization when a new DMEPOS location is opened. The accreditation organization may accredit the new supplier location for three months after it is operational without requiring a new site visit. All DMEPOS supplier locations, whether owned or subcontracted, must meet the [employer] and be separately accredited in order to bill Medicare. An accredited supplier may be denied enrollment or their enrollment may be revoked, if CMS determines that they are not in compliance with the DMEPOS quality standards. A DMEPOS supplier must disclose upon enrollment all products and services, including the addition of new product lines for which they are seeking accreditation. If a new product line is added after enrollment, the DMEPOS supplier will be responsible for notifying the accrediting body of the new product so that the DMEPOS supplier can be re-surveyed and accredited for these new products. Must meet the surety bond requirements specified in 42 C. F.R. 424.57(c). Implementation date- May 4, 2009. A supplier must obtain oxygen from a state-licensed oxygen supplier. A supplier must maintain ordering and referring documentation consistent with provisions found in 42 C. F.R. 424.516(f).   DMEPOS suppliers are prohibited from sharing a practice location with certain other Medicare providers and suppliers. DMEPOS suppliers must remain open to the public for a minimum of 30 hours per week with certain exceptions.

## 2022-12-02 NOTE — PROGRESS NOTES
Name: Valente Rondon  YOB: 1970  Gender: female  MRN: [id number]      CC: Follow-up (L shoulder pre-op)       HPI: Valente Rondon is a 47 y.o. female who returns for follow up preoperative appointment on left shoulder pain. She is scheduled for a Left Shoulder Arthroscopy Subacromial Decompression, Distal Clavicle Resection & Debridement with Dr. Kathryn Álvarez on 12/09/2022. Patient reports continued left shoulder pain and would like to proceed with surgery. Current Outpatient Medications:     oxyCODONE (ROXICODONE) 5 MG immediate release tablet, Take 1 tablet by mouth every 4 hours as needed for Pain for up to 5 days. Intended supply: 5 days. Take lowest dose possible to manage pain (Patient not taking: Reported on 12/2/2022), Disp: 30 tablet, Rfl: 0    ondansetron (ZOFRAN) 4 MG tablet, Take 1 tablet by mouth daily as needed for Nausea or Vomiting (Patient not taking: Reported on 12/2/2022), Disp: 30 tablet, Rfl: 0    amoxicillin-clavulanate (AUGMENTIN) 875-125 MG per tablet, Take 1 tablet by mouth 2 times daily for 10 days, Disp: 20 tablet, Rfl: 0    Tirzepatide (MOUNJARO) 2.5 MG/0.5ML SOPN SC injection, Inject 2.5 mg into the skin once a week, Disp: , Rfl:     diclofenac (VOLTAREN) 75 MG EC tablet, TAKE 1 TABLET BY MOUTH TWICE DAILY, Disp: 60 tablet, Rfl: 1    ibuprofen (ADVIL;MOTRIN) 600 MG tablet, Take 600 mg by mouth 2 times daily as needed, Disp: , Rfl:   No Known Allergies  Past Medical History:   Diagnosis Date    Morbid obesity with BMI of 45.0-49.9, adult (HCC)     Tachycardia      No past surgical history on file. No family history on file.   Social History     Socioeconomic History    Marital status:      Spouse name: Not on file    Number of children: Not on file    Years of education: Not on file    Highest education level: Not on file   Occupational History    Not on file   Tobacco Use    Smoking status: Every Day     Packs/day: 0.50     Types: Cigarettes    Smokeless tobacco: Never    Tobacco comments:     Quit smoking: quit 2011   Substance and Sexual Activity    Alcohol use: No    Drug use: No    Sexual activity: Not on file   Other Topics Concern    Not on file   Social History Narrative    Not on file     Social Determinants of Health     Financial Resource Strain: Not on file   Food Insecurity: Not on file   Transportation Needs: Not on file   Physical Activity: Not on file   Stress: Not on file   Social Connections: Not on file   Intimate Partner Violence: Not on file   Housing Stability: Not on file           Physical Examination:  General: no acute distress  Lungs: breathing easily, CTAB  HEENT: NCAT  Abdomen: soft  CV: regular rhythm by pulse, S1/S2 by auscultation  Left Shoulder:Tenderness palpation over the biceps anteriorly. Tenderness palpation of the AC joint. Pain with crossarm adduction over the [facility] joint. Pain with speeds pain with Ashley's. Active elevation to 150 degrees limited by pain in the extreme. She has pain with O'Allen's and Ashley's. She appears to have appropriate resisted rotator cuff strength and empty can internal and external rotation positions although painful. Assessment:   1. Adhesive capsulitis of left shoulder    2. Impingement syndrome of left shoulder    3. Biceps tendinitis of left shoulder    4. Arthritis of left acromioclavicular joint         Plan:   Surgical plans for left shoulder arthroscopy subacromial decompression, distal clavicle resection and debridement. I discussed with the patient the importance of bringing all equipment with her to the day of surgery for placement in the OR. I explained to the patient the importance of attending formal physical therapy. I provided the patient with a postoperative pain prescription and instructed her to pick it up within the next 5 days to prevent expiration and to not take the medication until after surgery.  We discussed the risks of the procedure including but not limited to infection, bleeding, anesthetic complications postoperative DVT/PE.   All of his questions have been answered and they elect to proceed as planned      SELENE Gutierrez - CNP    Orthopaedics and Sports Medicine

## 2022-12-02 NOTE — PROGRESS NOTES
Subjective:      Patient ID: Andreas Fitzgerald is a 47 y.o. female. Ear Fullness   There is pain in the left ear. This is a new problem. Episode onset: 7 days. Progression since onset: Upper respiratory infection symptoms of cough improving but ear pain and sinus pain worsening. There has been no fever. The pain is mild. Associated symptoms include headaches, hearing loss and rhinorrhea. Pertinent negatives include no coughing, ear discharge, rash or sore throat. Associated symptoms comments: Left ear sounds are muffled, Sinus pain, headache across the forehead, nasal secretions are thick and green. Treatments tried: Flonase, Claritin D, mucinex. The treatment provided no relief. Covid test and Flu test both negative    Review of Systems   HENT:  Positive for congestion, ear pain, hearing loss, rhinorrhea and sinus pain. Negative for ear discharge and sore throat. Left ear pain   Respiratory: Negative. Negative for cough. Cardiovascular: Negative. Musculoskeletal:  Negative for myalgias. Skin:  Negative for rash. Neurological:  Positive for headaches. Negative for dizziness and weakness. No Known Allergies   Current Outpatient Medications on File Prior to Visit   Medication Sig Dispense Refill    Tirzepatide (MOUNJARO) 2.5 MG/0.5ML SOPN SC injection Inject 2.5 mg into the skin once a week      diclofenac (VOLTAREN) 75 MG EC tablet TAKE 1 TABLET BY MOUTH TWICE DAILY 60 tablet 1    ibuprofen (ADVIL;MOTRIN) 600 MG tablet Take 600 mg by mouth 2 times daily as needed      oxyCODONE (ROXICODONE) 5 MG immediate release tablet Take 1 tablet by mouth every 4 hours as needed for Pain for up to 5 days. Intended supply: 5 days.  Take lowest dose possible to manage pain (Patient not taking: Reported on 12/2/2022) 30 tablet 0    ondansetron (ZOFRAN) 4 MG tablet Take 1 tablet by mouth daily as needed for Nausea or Vomiting (Patient not taking: Reported on 12/2/2022) 30 tablet 0    Semaglutide,0.25 or 0.5MG/DOS, (OZEMPIC, 0.25 OR 0.5 MG/DOSE,) 2 MG/1.5ML SOPN  (Patient not taking: Reported on 12/2/2022)      fluticasone (FLONASE) 50 MCG/ACT nasal spray 2 sprays by Nasal route daily      loratadine-pseudoephedrine (CLARITIN-D 24HR)  MG per extended release tablet Take 1 tablet by mouth daily       No current facility-administered medications on file prior to visit. Objective:   Physical Exam  Vitals reviewed. Constitutional:       General: She is not in acute distress. Appearance: Normal appearance. She is not ill-appearing. HENT:      Head: Normocephalic. Right Ear: Ear canal and external ear normal. No tenderness. A middle ear effusion is present. Tympanic membrane is not injected, erythematous or bulging. Left Ear: Ear canal and external ear normal. No tenderness. Tympanic membrane is injected and erythematous. Ears:      Comments: Left ear 50% occluded with soft cerumen. TM visualized is red and bulging     Nose: Mucosal edema, congestion and rhinorrhea present. No nasal tenderness. Rhinorrhea is purulent. Right Sinus: Frontal sinus tenderness present. No maxillary sinus tenderness. Left Sinus: Frontal sinus tenderness present. No maxillary sinus tenderness. Mouth/Throat:      Lips: Pink. Mouth: Mucous membranes are moist.      Pharynx: Oropharynx is clear. Uvula midline. Cardiovascular:      Rate and Rhythm: Normal rate and regular rhythm. Heart sounds: Normal heart sounds. Pulmonary:      Effort: Pulmonary effort is normal. No respiratory distress. Breath sounds: Normal breath sounds. No stridor. No wheezing or rhonchi. Skin:     General: Skin is warm and dry. Neurological:      General: No focal deficit present. Mental Status: She is alert and oriented to person, place, and time. Psychiatric:         Mood and Affect: Mood normal.         Behavior: Behavior normal.         Thought Content:  Thought content normal. Judgment: Judgment normal.     Vitals:    12/02/22 1038   BP: 122/84   Pulse: 100   Resp: 14   Temp: 98.3 °F (36.8 °C)   SpO2: 96%      Assessment:       Diagnosis Orders   1. Left non-suppurative otitis media  amoxicillin-clavulanate (AUGMENTIN) 875-125 MG per tablet      2. Acute frontal sinusitis, recurrence not specified  amoxicillin-clavulanate (AUGMENTIN) 875-125 MG per tablet      3. Right acute serous otitis media, recurrence not specified               Plan:      P:    * Rxs provided: Augmentin 875 mg one po BID x 10 days, #20, NR  Recommendations:  Continue Flonase daily, recommend switching to Claritin without the Decongestant to help prevent mucous membranes from getting to dry and thick secretions  Mucinex prn as directed  May use cool-mist humidifier/Vaporizer at bedside/living quarters. Recommended saline rinses with OceanMist spray or using NettiPot. Encouraged increase fluid intake for adequate hydration. Reviewed good hand hygiene. Tylenol/Ibuprofen for aches/pains. Throat lozenges, honey, or warm salt water gargles for sore throat. F/U in the clinic next prn to recheck left ear and possibly irrigate if infection clearing    Risks and benefits of medications reviewed with patient . Follow up: 3-5 days if not any better   Sooner if worsening symptoms  F/U in Urgent care if clinic and PCP closed    Seek ER care for chest pain or SOB. Patient voices understanding and agrees with the plan of care as described above. Labs performed/ordered:  Educational information:  Educational material reviewed and provided at checkout re:_____  Follow up: Patient was encouraged to return to the clinic and/or PCP if s/s persist or do not resolve completely.  Also advised to seek emergent care if worsening signs and symptoms warrant immediate evaluation including, but not limited to HA, blurred vision, speech disturbance, difficulty with ambulation/gait, numbness, tingling, weakness, syncope, abdominal pain, chest pain, or shortness of breath. *Side effects, adverse effects, risks versus benefits associated with medications prescribed/recommended were discussed with the patient. Patient verbalized understanding. All questions answered. * I have reviewed the patient's medication list, past medical, family, social, and surgical    history and updated the patient record appropriately      Social History     Socioeconomic History    Marital status:      Spouse name: Not on file    Number of children: Not on file    Years of education: Not on file    Highest education level: Not on file   Occupational History    Not on file   Tobacco Use    Smoking status: Every Day     Packs/day: 0.50     Types: Cigarettes    Smokeless tobacco: Never    Tobacco comments:     Quit smoking: quit 2011   Substance and Sexual Activity    Alcohol use: No    Drug use: No    Sexual activity: Not on file   Other Topics Concern    Not on file   Social History Narrative    Not on file     Social Determinants of Health     Financial Resource Strain: Not on file   Food Insecurity: Not on file   Transportation Needs: Not on file   Physical Activity: Not on file   Stress: Not on file   Social Connections: Not on file   Intimate Partner Violence: Not on file   Housing Stability: Not on file     Past Medical History:   Diagnosis Date    Morbid obesity with BMI of 45.0-49.9, adult ([address]     Tachycardia      No past surgical history on file. No family history on file. Fernanda Justice  Clerance Chain - CNP

## 2022-12-02 NOTE — H&P (VIEW-ONLY)
Name: Milton Jama  YOB: 1970  Gender: female  MRN: [id number]      CC: Follow-up (L shoulder pre-op)       HPI: Milton Jama is a 47 y.o. female who returns for follow up preoperative appointment on left shoulder pain. She is scheduled for a Left Shoulder Arthroscopy Subacromial Decompression, Distal Clavicle Resection & Debridement with Dr. Barrett Members on 12/09/2022. Patient reports continued left shoulder pain and would like to proceed with surgery. Current Outpatient Medications:     oxyCODONE (ROXICODONE) 5 MG immediate release tablet, Take 1 tablet by mouth every 4 hours as needed for Pain for up to 5 days. Intended supply: 5 days. Take lowest dose possible to manage pain (Patient not taking: Reported on 12/2/2022), Disp: 30 tablet, Rfl: 0    ondansetron (ZOFRAN) 4 MG tablet, Take 1 tablet by mouth daily as needed for Nausea or Vomiting (Patient not taking: Reported on 12/2/2022), Disp: 30 tablet, Rfl: 0    amoxicillin-clavulanate (AUGMENTIN) 875-125 MG per tablet, Take 1 tablet by mouth 2 times daily for 10 days, Disp: 20 tablet, Rfl: 0    Tirzepatide (MOUNJARO) 2.5 MG/0.5ML SOPN SC injection, Inject 2.5 mg into the skin once a week, Disp: , Rfl:     diclofenac (VOLTAREN) 75 MG EC tablet, TAKE 1 TABLET BY MOUTH TWICE DAILY, Disp: 60 tablet, Rfl: 1    ibuprofen (ADVIL;MOTRIN) 600 MG tablet, Take 600 mg by mouth 2 times daily as needed, Disp: , Rfl:   No Known Allergies  Past Medical History:   Diagnosis Date    Morbid obesity with BMI of 45.0-49.9, adult (HCC)     Tachycardia      No past surgical history on file. No family history on file.   Social History     Socioeconomic History    Marital status:      Spouse name: Not on file    Number of children: Not on file    Years of education: Not on file    Highest education level: Not on file   Occupational History    Not on file   Tobacco Use    Smoking status: Every Day     Packs/day: 0.50     Types: Cigarettes    Smokeless tobacco: Never    Tobacco comments:     Quit smoking: quit 2011   Substance and Sexual Activity    Alcohol use: No    Drug use: No    Sexual activity: Not on file   Other Topics Concern    Not on file   Social History Narrative    Not on file     Social Determinants of Health     Financial Resource Strain: Not on file   Food Insecurity: Not on file   Transportation Needs: Not on file   Physical Activity: Not on file   Stress: Not on file   Social Connections: Not on file   Intimate Partner Violence: Not on file   Housing Stability: Not on file           Physical Examination:  General: no acute distress  Lungs: breathing easily, CTAB  HEENT: NCAT  Abdomen: soft  CV: regular rhythm by pulse, S1/S2 by auscultation  Left Shoulder:Tenderness palpation over the biceps anteriorly. Tenderness palpation of the AC joint. Pain with crossarm adduction over the [facility] [place] joint. Pain with speeds pain with Hardy's. Active elevation to 150 degrees limited by pain in the extreme. She has pain with O'Allen's and Hardy's. She appears to have appropriate resisted rotator cuff strength and empty can internal and external rotation positions although painful. Assessment:   1. Adhesive capsulitis of left shoulder    2. Impingement syndrome of left shoulder    3. Biceps tendinitis of left shoulder    4. Arthritis of left acromioclavicular joint         Plan:   Surgical plans for left shoulder arthroscopy subacromial decompression, distal clavicle resection and debridement. I discussed with the patient the importance of bringing all equipment with her to the day of surgery for placement in the OR. I explained to the patient the importance of attending formal physical therapy. I provided the patient with a postoperative pain prescription and instructed her to pick it up within the next 5 days to prevent expiration and to not take the medication until after surgery.  We discussed the risks of the procedure including but not limited to infection, bleeding, anesthetic complications postoperative DVT/PE.   All of his questions have been answered and they elect to proceed as planned      SELENE Barrett - CNP    Orthopaedics and Sports Medicine

## 2022-12-02 NOTE — PROGRESS NOTES
Patient was fitted and instructed on the use of Arm Sling for the left shoulder. Patient is aware the arm should fit comfortably in the sling with the elbow as far back as possible. I explained the thumb should be placed in the thumb loop to help prevent the arm from sliding out of the sling. Patient was instructed that the shoulder strap should cross over the opposite shoulder continuing threw the loop attached to the sling and then velcro back on the shoulder strap. Patient read and signed documenting they understand and agree to [facility]'s current DME return policy.

## 2022-12-05 ENCOUNTER — HOME HEALTH ADMISSION (OUTPATIENT)
Dept: HOME HEALTH SERVICES | Facility: HOME HEALTH | Age: 52
End: 2022-12-05
Payer: COMMERCIAL

## 2022-12-05 ENCOUNTER — PATIENT MESSAGE (OUTPATIENT)
Dept: ORTHOPEDIC SURGERY | Age: 52
End: 2022-12-05

## 2022-12-05 DIAGNOSIS — M75.42 IMPINGEMENT SYNDROME OF LEFT SHOULDER: ICD-10-CM

## 2022-12-05 DIAGNOSIS — M75.02 ADHESIVE CAPSULITIS OF LEFT SHOULDER: Primary | ICD-10-CM

## 2022-12-05 DIAGNOSIS — M75.22 BICEPS TENDINITIS OF LEFT SHOULDER: ICD-10-CM

## 2022-12-05 DIAGNOSIS — M19.012 ARTHRITIS OF LEFT ACROMIOCLAVICULAR JOINT: ICD-10-CM

## 2022-12-05 NOTE — TELEPHONE ENCOUNTER
From: Shea To  To: Samia Yap  Sent: 12/5/2022 7:33 AM EST  Subject: Homecare     Good morning! !  I wanted to see if I could have [employer] come to the house for physical therapy? I dont have anyone that can take me for therapy. Thanks uAdra Mcgowan

## 2022-12-08 ENCOUNTER — ANESTHESIA EVENT (OUTPATIENT)
Dept: SURGERY | Age: 52
End: 2022-12-08
Payer: COMMERCIAL

## 2022-12-08 RX ORDER — HALOPERIDOL 5 MG/ML
1 INJECTION INTRAMUSCULAR
Status: CANCELLED | OUTPATIENT
Start: 2022-12-08 | End: 2022-12-09

## 2022-12-08 RX ORDER — ONDANSETRON 2 MG/ML
4 INJECTION INTRAMUSCULAR; INTRAVENOUS
Status: CANCELLED | OUTPATIENT
Start: 2022-12-08 | End: 2022-12-09

## 2022-12-08 RX ORDER — IPRATROPIUM BROMIDE AND ALBUTEROL SULFATE 2.5; .5 MG/3ML; MG/3ML
1 SOLUTION RESPIRATORY (INHALATION)
Status: CANCELLED | OUTPATIENT
Start: 2022-12-08 | End: 2022-12-09

## 2022-12-08 RX ORDER — SODIUM CHLORIDE 0.9 % (FLUSH) 0.9 %
5-40 SYRINGE (ML) INJECTION EVERY 12 HOURS SCHEDULED
Status: CANCELLED | OUTPATIENT
Start: 2022-12-08

## 2022-12-08 RX ORDER — FENTANYL CITRATE 50 UG/ML
50 INJECTION, SOLUTION INTRAMUSCULAR; INTRAVENOUS EVERY 5 MIN PRN
Status: CANCELLED | OUTPATIENT
Start: 2022-12-08

## 2022-12-08 RX ORDER — SODIUM CHLORIDE 0.9 % (FLUSH) 0.9 %
5-40 SYRINGE (ML) INJECTION PRN
Status: CANCELLED | OUTPATIENT
Start: 2022-12-08

## 2022-12-08 RX ORDER — OXYCODONE HYDROCHLORIDE 5 MG/1
5 TABLET ORAL
Status: CANCELLED | OUTPATIENT
Start: 2022-12-08 | End: 2022-12-09

## 2022-12-08 RX ORDER — HYDROMORPHONE HCL 110MG/55ML
0.5 PATIENT CONTROLLED ANALGESIA SYRINGE INTRAVENOUS EVERY 10 MIN PRN
Status: CANCELLED | OUTPATIENT
Start: 2022-12-08

## 2022-12-09 ENCOUNTER — HOSPITAL ENCOUNTER (OUTPATIENT)
Age: 52
Setting detail: OUTPATIENT SURGERY
Discharge: HOME | End: 2022-12-09
Attending: ORTHOPAEDIC SURGERY | Admitting: ORTHOPAEDIC SURGERY
Payer: COMMERCIAL

## 2022-12-09 ENCOUNTER — ANESTHESIA (OUTPATIENT)
Dept: SURGERY | Age: 52
End: 2022-12-09
Payer: COMMERCIAL

## 2022-12-09 VITALS
HEART RATE: 95 BPM | TEMPERATURE: 97.4 F | OXYGEN SATURATION: 93 % | BODY MASS INDEX: 30.79 KG/M2 | SYSTOLIC BLOOD PRESSURE: 111 MMHG | WEIGHT: 185 LBS | RESPIRATION RATE: 16 BRPM | DIASTOLIC BLOOD PRESSURE: 57 MMHG

## 2022-12-09 PROCEDURE — 29823 SHO ARTHRS SRG XTNSV DBRDMT: CPT | Performed by: ORTHOPAEDIC SURGERY

## 2022-12-09 PROCEDURE — 6360000002 HC RX W HCPCS: Performed by: NURSE ANESTHETIST, CERTIFIED REGISTERED

## 2022-12-09 PROCEDURE — 7100000001 HC PACU RECOVERY - ADDTL 15 MIN: Performed by: ORTHOPAEDIC SURGERY

## 2022-12-09 PROCEDURE — 76942 ECHO GUIDE FOR BIOPSY: CPT | Performed by: ANESTHESIOLOGY

## 2022-12-09 PROCEDURE — 3600000004 HC SURGERY LEVEL 4 BASE: Performed by: ORTHOPAEDIC SURGERY

## 2022-12-09 PROCEDURE — 7100000000 HC PACU RECOVERY - FIRST 15 MIN: Performed by: ORTHOPAEDIC SURGERY

## 2022-12-09 PROCEDURE — 2580000003 HC RX 258: Performed by: ANESTHESIOLOGY

## 2022-12-09 PROCEDURE — 2709999900 HC NON-CHARGEABLE SUPPLY: Performed by: ORTHOPAEDIC SURGERY

## 2022-12-09 PROCEDURE — 2500000003 HC RX 250 WO HCPCS: Performed by: ANESTHESIOLOGY

## 2022-12-09 PROCEDURE — 6360000002 HC RX W HCPCS: Performed by: ORTHOPAEDIC SURGERY

## 2022-12-09 PROCEDURE — 2500000003 HC RX 250 WO HCPCS: Performed by: NURSE ANESTHETIST, CERTIFIED REGISTERED

## 2022-12-09 PROCEDURE — 2720000010 HC SURG SUPPLY STERILE: Performed by: ORTHOPAEDIC SURGERY

## 2022-12-09 PROCEDURE — 7100000010 HC PHASE II RECOVERY - FIRST 15 MIN: Performed by: ORTHOPAEDIC SURGERY

## 2022-12-09 PROCEDURE — 3600000014 HC SURGERY LEVEL 4 ADDTL 15MIN: Performed by: ORTHOPAEDIC SURGERY

## 2022-12-09 PROCEDURE — C1713 ANCHOR/SCREW BN/BN,TIS/BN: HCPCS | Performed by: ORTHOPAEDIC SURGERY

## 2022-12-09 PROCEDURE — 3700000001 HC ADD 15 MINUTES (ANESTHESIA): Performed by: ORTHOPAEDIC SURGERY

## 2022-12-09 PROCEDURE — 29826 SHO ARTHRS SRG DECOMPRESSION: CPT | Performed by: ORTHOPAEDIC SURGERY

## 2022-12-09 PROCEDURE — 29827 SHO ARTHRS SRG RT8TR CUF RPR: CPT | Performed by: ORTHOPAEDIC SURGERY

## 2022-12-09 PROCEDURE — 6360000002 HC RX W HCPCS: Performed by: ANESTHESIOLOGY

## 2022-12-09 PROCEDURE — 6370000000 HC RX 637 (ALT 250 FOR IP): Performed by: ANESTHESIOLOGY

## 2022-12-09 PROCEDURE — 29824 SHO ARTHRS SRG DSTL CLAVICLC: CPT | Performed by: ORTHOPAEDIC SURGERY

## 2022-12-09 PROCEDURE — 3700000000 HC ANESTHESIA ATTENDED CARE: Performed by: ORTHOPAEDIC SURGERY

## 2022-12-09 DEVICE — ANCHOR SUTURE BIOCOMP 4.75X19.1 MM SWIVELOCK C: Type: IMPLANTABLE DEVICE | Site: SHOULDER | Status: FUNCTIONAL

## 2022-12-09 RX ORDER — ACETAMINOPHEN 500 MG
1000 TABLET ORAL ONCE
Status: COMPLETED | OUTPATIENT
Start: 2022-12-09 | End: 2022-12-09

## 2022-12-09 RX ORDER — LIDOCAINE HYDROCHLORIDE 20 MG/ML
INJECTION, SOLUTION EPIDURAL; INFILTRATION; INTRACAUDAL; PERINEURAL PRN
Status: DISCONTINUED | OUTPATIENT
Start: 2022-12-09 | End: 2022-12-09 | Stop reason: SDUPTHER

## 2022-12-09 RX ORDER — PHENYLEPHRINE HYDROCHLORIDE 10 MG/ML
INJECTION INTRAVENOUS PRN
Status: DISCONTINUED | OUTPATIENT
Start: 2022-12-09 | End: 2022-12-09 | Stop reason: SDUPTHER

## 2022-12-09 RX ORDER — IPRATROPIUM BROMIDE AND ALBUTEROL SULFATE 2.5; .5 MG/3ML; MG/3ML
1 SOLUTION RESPIRATORY (INHALATION)
Status: DISCONTINUED | OUTPATIENT
Start: 2022-12-09 | End: 2022-12-09 | Stop reason: HOSPADM

## 2022-12-09 RX ORDER — NEOSTIGMINE METHYLSULFATE 1 MG/ML
INJECTION, SOLUTION INTRAVENOUS PRN
Status: DISCONTINUED | OUTPATIENT
Start: 2022-12-09 | End: 2022-12-09 | Stop reason: SDUPTHER

## 2022-12-09 RX ORDER — EPINEPHRINE 1 MG/ML(1)
AMPUL (ML) INJECTION PRN
Status: DISCONTINUED | OUTPATIENT
Start: 2022-12-09 | End: 2022-12-09 | Stop reason: HOSPADM

## 2022-12-09 RX ORDER — ROCURONIUM BROMIDE 10 MG/ML
INJECTION, SOLUTION INTRAVENOUS PRN
Status: DISCONTINUED | OUTPATIENT
Start: 2022-12-09 | End: 2022-12-09 | Stop reason: SDUPTHER

## 2022-12-09 RX ORDER — TRANEXAMIC ACID 100 MG/ML
INJECTION, SOLUTION INTRAVENOUS PRN
Status: DISCONTINUED | OUTPATIENT
Start: 2022-12-09 | End: 2022-12-09 | Stop reason: SDUPTHER

## 2022-12-09 RX ORDER — SODIUM CHLORIDE, SODIUM LACTATE, POTASSIUM CHLORIDE, CALCIUM CHLORIDE 600; 310; 30; 20 MG/100ML; MG/100ML; MG/100ML; MG/100ML
INJECTION, SOLUTION INTRAVENOUS CONTINUOUS
Status: DISCONTINUED | OUTPATIENT
Start: 2022-12-09 | End: 2022-12-09 | Stop reason: HOSPADM

## 2022-12-09 RX ORDER — MIDAZOLAM HYDROCHLORIDE 2 MG/2ML
2 INJECTION, SOLUTION INTRAMUSCULAR; INTRAVENOUS
Status: COMPLETED | OUTPATIENT
Start: 2022-12-09 | End: 2022-12-09

## 2022-12-09 RX ORDER — MIDAZOLAM HYDROCHLORIDE 1 MG/ML
INJECTION INTRAMUSCULAR; INTRAVENOUS PRN
Status: DISCONTINUED | OUTPATIENT
Start: 2022-12-09 | End: 2022-12-09 | Stop reason: SDUPTHER

## 2022-12-09 RX ORDER — FENTANYL CITRATE 50 UG/ML
INJECTION, SOLUTION INTRAMUSCULAR; INTRAVENOUS PRN
Status: DISCONTINUED | OUTPATIENT
Start: 2022-12-09 | End: 2022-12-09 | Stop reason: SDUPTHER

## 2022-12-09 RX ORDER — GLYCOPYRROLATE 0.2 MG/ML
INJECTION INTRAMUSCULAR; INTRAVENOUS PRN
Status: DISCONTINUED | OUTPATIENT
Start: 2022-12-09 | End: 2022-12-09 | Stop reason: SDUPTHER

## 2022-12-09 RX ORDER — PROPOFOL 10 MG/ML
INJECTION, EMULSION INTRAVENOUS PRN
Status: DISCONTINUED | OUTPATIENT
Start: 2022-12-09 | End: 2022-12-09 | Stop reason: SDUPTHER

## 2022-12-09 RX ORDER — ONDANSETRON 2 MG/ML
INJECTION INTRAMUSCULAR; INTRAVENOUS PRN
Status: DISCONTINUED | OUTPATIENT
Start: 2022-12-09 | End: 2022-12-09 | Stop reason: SDUPTHER

## 2022-12-09 RX ORDER — BUPIVACAINE HYDROCHLORIDE 5 MG/ML
INJECTION, SOLUTION EPIDURAL; INTRACAUDAL
Status: COMPLETED | OUTPATIENT
Start: 2022-12-09 | End: 2022-12-09

## 2022-12-09 RX ORDER — EPHEDRINE SULFATE/0.9% NACL/PF 50 MG/5 ML
SYRINGE (ML) INTRAVENOUS PRN
Status: DISCONTINUED | OUTPATIENT
Start: 2022-12-09 | End: 2022-12-09 | Stop reason: SDUPTHER

## 2022-12-09 RX ORDER — DEXAMETHASONE SODIUM PHOSPHATE 10 MG/ML
INJECTION INTRAMUSCULAR; INTRAVENOUS PRN
Status: DISCONTINUED | OUTPATIENT
Start: 2022-12-09 | End: 2022-12-09 | Stop reason: SDUPTHER

## 2022-12-09 RX ORDER — LIDOCAINE HYDROCHLORIDE 10 MG/ML
1 INJECTION, SOLUTION INFILTRATION; PERINEURAL
Status: DISCONTINUED | OUTPATIENT
Start: 2022-12-09 | End: 2022-12-09 | Stop reason: HOSPADM

## 2022-12-09 RX ADMIN — ROCURONIUM BROMIDE 35 MG: 50 INJECTION, SOLUTION INTRAVENOUS at 11:22

## 2022-12-09 RX ADMIN — ONDANSETRON 4 MG: 2 INJECTION INTRAMUSCULAR; INTRAVENOUS at 12:20

## 2022-12-09 RX ADMIN — TRANEXAMIC ACID 1000 MG: 100 INJECTION, SOLUTION INTRAVENOUS at 11:26

## 2022-12-09 RX ADMIN — Medication 2000 MG: at 11:26

## 2022-12-09 RX ADMIN — Medication 20 MG: at 11:31

## 2022-12-09 RX ADMIN — Medication 5 MG: at 12:14

## 2022-12-09 RX ADMIN — Medication 10 MG: at 12:03

## 2022-12-09 RX ADMIN — LIDOCAINE HYDROCHLORIDE 100 MG: 20 INJECTION, SOLUTION EPIDURAL; INFILTRATION; INTRACAUDAL; PERINEURAL at 11:22

## 2022-12-09 RX ADMIN — PHENYLEPHRINE HYDROCHLORIDE 50 MCG: 10 INJECTION INTRAVENOUS at 12:14

## 2022-12-09 RX ADMIN — ROCURONIUM BROMIDE 15 MG: 50 INJECTION, SOLUTION INTRAVENOUS at 11:38

## 2022-12-09 RX ADMIN — MIDAZOLAM 2 MG: 1 INJECTION INTRAMUSCULAR; INTRAVENOUS at 11:22

## 2022-12-09 RX ADMIN — FENTANYL CITRATE 100 MCG: 50 INJECTION, SOLUTION INTRAMUSCULAR; INTRAVENOUS at 11:22

## 2022-12-09 RX ADMIN — PROPOFOL 200 MG: 10 INJECTION, EMULSION INTRAVENOUS at 11:22

## 2022-12-09 RX ADMIN — PHENYLEPHRINE HYDROCHLORIDE 100 MCG: 10 INJECTION INTRAVENOUS at 11:51

## 2022-12-09 RX ADMIN — Medication 3 MG: at 12:28

## 2022-12-09 RX ADMIN — MIDAZOLAM 2 MG: 1 INJECTION INTRAMUSCULAR; INTRAVENOUS at 10:15

## 2022-12-09 RX ADMIN — Medication 5 MG: at 12:25

## 2022-12-09 RX ADMIN — Medication 5 MG: at 12:24

## 2022-12-09 RX ADMIN — GLYCOPYRROLATE 0.6 MG: 0.2 INJECTION, SOLUTION INTRAMUSCULAR; INTRAVENOUS at 12:28

## 2022-12-09 RX ADMIN — BUPIVACAINE HYDROCHLORIDE 20 ML: 5 INJECTION, SOLUTION EPIDURAL; INTRACAUDAL; PERINEURAL at 10:19

## 2022-12-09 RX ADMIN — DEXAMETHASONE SODIUM PHOSPHATE 10 MG: 10 INJECTION INTRAMUSCULAR; INTRAVENOUS at 11:45

## 2022-12-09 RX ADMIN — PROPOFOL 50 MG: 10 INJECTION, EMULSION INTRAVENOUS at 12:32

## 2022-12-09 RX ADMIN — SODIUM CHLORIDE, POTASSIUM CHLORIDE, SODIUM LACTATE AND CALCIUM CHLORIDE: 600; 310; 30; 20 INJECTION, SOLUTION INTRAVENOUS at 10:21

## 2022-12-09 RX ADMIN — PHENYLEPHRINE HYDROCHLORIDE 100 MCG: 10 INJECTION INTRAVENOUS at 11:39

## 2022-12-09 RX ADMIN — PHENYLEPHRINE HYDROCHLORIDE 50 MCG: 10 INJECTION INTRAVENOUS at 12:03

## 2022-12-09 RX ADMIN — DEXAMETHASONE SODIUM PHOSPHATE 4 MG: 4 INJECTION, SOLUTION INTRAMUSCULAR; INTRAVENOUS at 10:19

## 2022-12-09 RX ADMIN — PHENYLEPHRINE HYDROCHLORIDE 100 MCG: 10 INJECTION INTRAVENOUS at 11:33

## 2022-12-09 RX ADMIN — ACETAMINOPHEN 1000 MG: 500 TABLET ORAL at 10:21

## 2022-12-09 ASSESSMENT — PAIN - FUNCTIONAL ASSESSMENT: PAIN_FUNCTIONAL_ASSESSMENT: 0-10

## 2022-12-09 ASSESSMENT — PAIN DESCRIPTION - DESCRIPTORS: DESCRIPTORS: THROBBING

## 2022-12-09 NOTE — DISCHARGE INSTRUCTIONS
Post-op instructions for Shoulder Rotator Cuff Repair / Labral Repair / Stabilization   Day of Surgery:     DIET:   Begin with liquids and light foods (jell-o, soup, etc.). Progress to your normal diet if you are not nauseated. MEDICATION:   1. Pain- Norco (hydrocodone/acetaminophen) or Oxycodone. If you are taking oxycodone, you may also take two (2) Tylenol (acetaminophen) 500mg tabs every eight (8) hours. Do not take Tylenol (acetaminophen) if you are given Norco as this medicine already contains acetaminophen. Do not drink alcohol while taking pain medication. Slowly wean off the pain medication as the pain improves. 2. Stool Softener-Pain medication can cause constipation. Be sure to drink plenty of water and take an over the counter stool softener as needed. ICE:  Do NOT put the ice machine directly on your skin. Use it frequently for the first 72 hours. You may also use a regular ice bag/pack for 20 minutes at a time. Place a thin layer of clothing or pillow case between ice pack and your skin. Ice for 20-30 minutes on and then 20-30 minutes off. Did  you  receive  a  nerve  Block? A  nerve  block  can  provide  pain  relief  for  one  hour  to  two  days  after  your  surgery. As  long  as  the  nerve  block  is  working,  you  will  experience  little  or  no  sensation  in  the  area  the  surgeon  operated  on. As  the  nerve  block  wears  off,  you  will  begin  to  experience  pain  or  discomfort. It  is  very  important  that  you  begin  taking  your  prescribed  pain  medication  before  the  nerve  block  fully  wears  off. The first sign that the nerve block is wearing off is tingling in your fingers. Treating  your  pain  at  the  first  sign  of  the  block  wearing  off  will  ensure  your  pain  is  better  controlled  and  more  tolerable  when  full-sensation  returns. Do  not  wait  until  the  pain  is  intolerable,  as  the  medicine  will  be  less  effective. It  is  better  to  treat  pain  in  advance  than  to  try  and  catch  up. General  Anesthesia or Sedation:      If  you  did  not  receive  a  nerve  block  during  your  surgery,  you  will  need  to  start  taking  your  pain  medication  shortly  after  your  surgery  and  should  continue  to  do  so  as  prescribed  by  your  surgeon. SHOWERING:  No showering. Leave bandages in place. If given a sling, wear it. ACTIVITY:  You may move your hand and wrist, opening and closing your fist.      First & Second Post-Op Day:    MEDICATION: Continue as instructed as above. BANDAGES: No showering. Keep bandage in place. EXERCISES: Begin with 3 sets of 10 of the following exercises:  1. Scapular retractions: squeeze shoulder blades together and hold for 1-2 seconds. 2. Move wrist up and down. 3. Open / close fist.   4. Bend and extend elbow (you may loosen the sling for this exercise). ICE: Continue to use the ice machine frequently as instructed above. Place a thin layer of clothing or pillow case between ice pack and your skin. Ice for 20-30 minutes on and then 20-30 minutes off. Third Post-Op Day:    MEDICATION:  Continue as instructed above. BANDAGES:   (after 72 hours/3 days): Remove outer bandages. Leave steri-strips in place. You may shower, but keep the incisions as dry as possible (cover with plastic wrap). It is best to sit down in the shower to avoid slipping. You may remove the sling for showers, but keep your operative arm in front of your body and DO NOT use the arm while showering. EXERCISES:    Continue exercises as noted above. ICE:   Continue to ice frequently eight with the ice machine or regular ice pack. Do not put it directly on your skin. Place a thin layer of clothing or pillow case between ice pack and your skin. Ice for 20-30 minutes on and then 20-30 minutes off.       PHYSICAL THERAPY APPOINTMENT:  Formal physical therapy typically begins within 1 week post-op. If you do not have an appointment please notify our office. SHOULDER RESPONSE TO SURGERY:  Your shoulder will be swollen. It may take a week or longer for this to resolve. It is common to notice bruising around the shoulder, upper arm, and into the elbow. Call with any problems or questions. [phone] if you have any questions or problems. Please contact us immediately if you notice fever greater than 101 degrees F, excessive bleeding, or drainage from the surgery site, calf pain, or shortness of breath. If you are calling after hours or on a weekend, you may receive a call back from the \"on call\" physician. MEDICATION INTERACTION:  During your procedure you potentially received a medication or medications which may reduce the effectiveness of oral contraceptives. Please consider other forms of contraception for 1 month following your procedure if you are currently using oral contraceptives as your primary form of birth control. In addition to this, we recommend continuing your oral contraceptive as prescribed, unless otherwise instructed by your physician, during this time    After general anesthesia or intravenous sedation, for 24 hours or while taking prescription Narcotics:  Limit your activities  A responsible adult needs to be with you for the next 24 hours  Do not drive and operate hazardous machinery  Do not make important personal or business decisions  Do not drink alcoholic beverages  If you have not urinated within 8 hours after discharge, and you are experiencing discomfort from urinary retention, please go to the nearest ED. If you have sleep apnea and have a CPAP machine, please use it for all naps and sleeping. Please use caution when taking narcotics and any of your home medications that may cause drowsiness. *  Please give a list of your current medications to your Primary Care Provider.   *  Please update this list whenever your medications are discontinued, doses are      changed, or new medications (including over-the-counter products) are added. *  Please carry medication information at all times in case of emergency situations. These are general instructions for a healthy lifestyle:  No smoking/ No tobacco products/ Avoid exposure to second hand smoke  Surgeon General's Warning:  Quitting smoking now greatly reduces serious risk to your health. Obesity, smoking, and sedentary lifestyle greatly increases your risk for illness  A healthy diet, regular physical exercise & weight monitoring are important for maintaining a healthy lifestyle    You may be retaining fluid if you have a history of heart failure or if you experience any of the following symptoms:  Weight gain of 3 pounds or more overnight or 5 pounds in a week, increased swelling in our hands or feet or shortness of breath while lying flat in bed. Please call your doctor as soon as you notice any of these symptoms; do not wait until your next office visit.

## 2022-12-09 NOTE — OP NOTE
Operative Report    Patient: Phil Siddiqi MRN: [id number]  SSN: [id number]    YOB: 1970  Age: 47 y.o. Sex: female       Date of Surgery: 12/9/2022     Preoperative Diagnosis:   1)left  Shoulder Rotator cuff tear  2)left  Shoulder outlet impingement  3) left shoulder AC joint arthritis  4)left  Shoulder biceps tendinitis/superior labral tear    Postoperative Diagnosis: Same as above     Surgeon(s) and Role:     * Irene Dong MD - Primary    Anesthesia: General + ISB block    Procedure: 1) left  Shoulder Arthroscopic Rotator Cuff Repair  2)  left shoulder arthroscopy with distal clavicle resection  3) left shoulder arthroscopy with debridement including biceps tenotomy, labrum tear, glenohumeral capsule   4) left  Shoulder Arthroscopy with subacromial decompression      Estimated Blood Loss:  5 mL      Implants: Arthrex Fibertape and swivel lock           Specimens: * No specimens in log *        Drains: None                Complications: None    Counts: Sponge and needle counts were correct times two. Procedure in Detail: After informed consent was obtained the surgical site was marked in the preoperative area by myself and the patient was brought to the operating room and general anesthesia was induced. Examination under anesthesia revealed full passive range of motion symmetric to the contralateral side. The patient was positioned in the beachchair position with all bony prominences well-padded and the operative shoulder was prepped and draped in the usual orthopedic sterile fashion. Timeout was performed per protocol antibiotics given per protocol. Initially a standard posterior lateral arthroscopy viewing portal was established. Diagnostic arthroscopy was carried out. Anterior interval portal was established under spinal needle guidance. There was maintenance of the articular surface of the humeral head and glenoid. The subscapularis was intact.  Rotator interval had moderate synovitis that was debrided away with a combination RF device and motorized shaver. .    Biceps tendon was pulled into the joint and had moderate tenosynovitis. superior labrum was probed and demonstrated degenerative SLAP tear. Anterior and posterior labrum both had degenerative free edge tearing that was debrided away with a shaver back to a stable rim. We proceeded with a biceps tenotomy with an RF device just distal to the biceps labral anchor the tendon was cut and allowed to retract distally. [place] Call was used to debride the superior labrum back to a stable rim. Annelle Castles Undersurface of the infraspinatus and supraspinatus demonstrated no tearing of the infraspinatus there was a partial articular sided tear of the leading edge of the supraspinatus and a crescent type shaped. [place] Call was used to debride this which left what appeared to be thin bursal fibers intact. PDS and spinal needle were used to tack this tear for identification on the bursal side. Arthroscope was then placed in the subacromial space there was an extensive amount of subacromial bursitis we established a lateral portal under spinal needle guidance and performed a subacromial bursectomy using a combination of an RF device and a motorized shaver. We exposed the undersurface of the acromion with an RF device. This revealed type II morphology with significant anterior inferior downsloping and a tight outlet. We then used a motorized bur from the lateral portal and performed a subacromial decompression resecting the anterior inferior downsloping and elevating this to create a flat elevated bleeding surface. RF device was used to expose lateral aspect of the clavicle as well as the medial acromion exposing the [facility] [place] joint which had advanced degenerative changes. We then switched the arthroscope to the lateral portal and work from the anterior portal exposing the end of the distal clavicle using an RF device.   There was significant osteophyte formation and joint space narrowing. We used a motorized bur to resect a few millimeters of the medial acromion and then turned our attention to the distal clavicle. Motorized bur was used to perform a distal clavicle resection resecting 8 to 10 mm of space at the [facility] joint addressing all osteophytes and taking care to get posterior and superior under arthroscopic visualization. RF device was used to obtain hemostasis. Rotator cuff from the bursal side demonstrated a small crescent shaped tear of approximately 1 cm. There were just a few fibers remaining once we had probed this the probe fell through. We elevated up a few bursal fibers with an RF device and this opened up the tear which was a crescent shaped 1 cm tear with minimal retraction. Greater tuberosity was gently decorticated using motorized shaver in preparation for healing. Cannula was placed laterally. We placed a  mattress fiber tape suture using a suture passing device. Both limbs were then pulled to a single row knotless 4.75 mm swivel lock anchor with the sutures and rotator cuff being tensioned appropriately. I was pleased with the quality of the tissue and the bone and the restoration of the footprint. The rotator cuff was moving as a unit with the tuberosity upon rotation of the arm. Shoulder was then drained portal sites were closed with buried Monocryl suture and Steri-Strips sterile dressing cryotherapy device and sling and abduction pillow were applied. Phuong tolerated the procedure well was awakened and transferred to the PACU in stable condition    Discharge plan:  Disposition: Home  Rehab protocol: Less than 3 cm rotator cuff repair and biceps tenotomy protocol.         Signed By:  Stephen Huynh MD     December 9, 2022

## 2022-12-09 NOTE — ANESTHESIA PRE PROCEDURE
Department of Anesthesiology  Preprocedure Note       Name:  Dwain Turner   Age:  47 y.o.  :  1970                                          MRN:  [id number]         Date:  2022      Surgeon: Jyothi Degroot):  Geronimo Downing MD    Procedure: Procedure(s):  LEFT SHOULDER ARTHROSCOPY DEBRIDEMENT, SUBACROMIAL DECOMPRESSION AND DISTAL CLAVICLE RESECTION     BEACH CHAIR       INTRASCALENE BLOCK    Medications prior to admission:   Prior to Admission medications    Medication Sig Start Date End Date Taking?  Authorizing Provider   ondansetron (ZOFRAN) 4 MG tablet Take 1 tablet by mouth daily as needed for Nausea or Vomiting  Patient not taking: Reported on 2022   SEELNE Barron - MUKESH   amoxicillin-clavulanate (AUGMENTIN) 875-125 MG per tablet Take 1 tablet by mouth 2 times daily for 10 days 22  SELENE Haque - CNP   Tirzepatide [facility]) 2.5 MG/0.5ML SOPN SC injection Inject 2.5 mg into the skin once a week    Historical Provider, MD   diclofenac (VOLTAREN) 75 MG EC tablet TAKE 1 TABLET BY MOUTH TWICE DAILY 10/21/22   SELENE Barron CNP   ibuprofen (ADVIL;MOTRIN) 600 MG tablet Take 600 mg by mouth 2 times daily as needed 3/1/18   Ar Automatic Reconciliation       Current medications:    Current Facility-Administered Medications   Medication Dose Route Frequency Provider Last Rate Last Admin    lidocaine 1 % injection 1 mL  1 mL IntraDERmal Once PRN Nancy Cabral MD        famotidine (PEPCID) 20 mg in sodium chloride (PF) 0.9 % 10 mL injection  20 mg IntraVENous Once PRN Nancy Cabral MD        lactated ringers infusion   IntraVENous Continuous Nancy Cabral MD 30 mL/hr at 22 1021 New Bag at 22 1021    ipratropium-albuterol (DUONEB) nebulizer solution 1 ampule  1 ampule Inhalation Once PRN Nancy Cabral MD        ceFAZolin (ANCEF) 2000 mg in sterile water 20 mL IV syringe  2,000 mg IntraVENous Once Geronimo Downing MD           Allergies:  No Known Allergies    Problem List:    Patient Active Problem List   Diagnosis Code    Adhesive capsulitis of left shoulder M75.02    Biceps tendinitis of left shoulder M75.22    Impingement syndrome of left shoulder M75.42    Arthritis of left acromioclavicular joint M19.012    Left shoulder pain M25.512       Past Medical History:        Diagnosis Date    Tachycardia        Past Surgical History:  History reviewed. No pertinent surgical history. Social History:    Social History     Tobacco Use    Smoking status: Every Day     Packs/day: 0.50     Types: Cigarettes    Smokeless tobacco: Never    Tobacco comments:     Quit smoking: quit 2011   Substance Use Topics    Alcohol use: No                                Ready to quit: Not Answered  Counseling given: Not Answered  Tobacco comments: Quit smoking: quit 2011      Vital Signs (Current):   Vitals:    12/09/22 0833 12/09/22 1015 12/09/22 1019   BP: 129/65  132/67   Pulse: 88 91 89   Resp: 18  16   Temp: 98 °F (36.7 °C)     TempSrc: Oral     SpO2: 96% 100% 99%   Weight: 185 lb (83.9 kg)                                                BP Readings from Last 3 Encounters:   12/09/22 132/67   12/02/22 122/84   11/26/22 138/79       NPO Status: Time of last liquid consumption: 2000                        Time of last solid consumption: 2000                        Date of last liquid consumption: 12/08/22                        Date of last solid food consumption: 12/08/22    BMI:   Wt Readings from Last 3 Encounters:   12/09/22 185 lb (83.9 kg)   11/26/22 185 lb (83.9 kg)   05/19/22 185 lb (83.9 kg)     Body mass index is 30.79 kg/m². CBC: No results found for: WBC, RBC, HGB, HCT, MCV, RDW, PLT    CMP:   Lab Results   Component Value Date/Time    GLUCOSE 96 09/03/2020 07:15 AM       POC Tests: No results for input(s): POCGLU, POCNA, POCK, POCCL, POCBUN, POCHEMO, POCHCT in the last 72 hours.     Coags: No results found for: PROTIME, INR, APTT    HCG (If Applicable): No results found for: PREGTESTUR, PREGSERUM, HCG, HCGQUANT     ABGs: No results found for: PHART, PO2ART, GHY3SOX, LQP9DKX, BEART, B2QTUIYZ     Type & Screen (If Applicable):  No results found for: LABABO, LABRH    Drug/Infectious Status (If Applicable):  No results found for: HIV, HEPCAB    COVID-19 Screening (If Applicable): No results found for: COVID19        Anesthesia Evaluation  Patient summary reviewed  Airway: Mallampati: II          Dental: normal exam         Pulmonary:Negative Pulmonary ROS breath sounds clear to auscultation                             Cardiovascular:  Exercise tolerance: good (>4 METS),       (-) past MI, murmur and peripheral edema      Rhythm: regular  Rate: normal                    Neuro/Psych:   Negative Neuro/Psych ROS     (-) CVA           GI/Hepatic/Renal: Neg GI/Hepatic/Renal ROS            Endo/Other: Negative Endo/Other ROS                    Abdominal:             Vascular: negative vascular ROS. Other Findings:           Anesthesia Plan      general     ASA 2     (GETA)  Induction: intravenous. Anesthetic plan and risks discussed with patient.               Post-op pain plan if not by surgeon: single peripheral nerve block            Raquel Diggs MD   12/9/2022

## 2022-12-09 NOTE — ANESTHESIA POSTPROCEDURE EVALUATION
Department of Anesthesiology  Postprocedure Note    Patient: Yoshi Mckeon  MRN: [id number]  YOB: 1970  Date of evaluation: 12/9/2022      Procedure Summary     Date: 12/09/22 Room / Location: [facility] OP OR 07 / [facility]    Anesthesia Start: 1111 Anesthesia Stop: 1250    Procedure: LEFT SHOULDER ARTHROSCOPY DEBRIDEMENT, SUBACROMIAL DECOMPRESSION AND DISTAL CLAVICLE RESECTION, BICEPS TENOTOMY, ROTATOR CUFF REPAIR (Left: Shoulder) Diagnosis:       Impingement syndrome of left shoulder      Adhesive capsulitis of left shoulder      Biceps tendinitis of left shoulder      Arthritis of left acromioclavicular joint      Left shoulder pain, unspecified chronicity      (Impingement syndrome of left shoulder [M75.42])      (Adhesive capsulitis of left shoulder [M75.02])      (Biceps tendinitis of left shoulder [M75.22])      (Arthritis of left acromioclavicular joint [M19.012])      (Left shoulder pain, unspecified chronicity [M25.512])    Surgeons: Mishel Geronimo MD Responsible Provider: Maurilio Craig MD    Anesthesia Type: general ASA Status: 2          Anesthesia Type: No value filed.     Debora Phase I: Debora Score: 7    Debora Phase II: Debora Score: 9      Anesthesia Post Evaluation    Patient location during evaluation: PACU  Patient participation: complete - patient participated  Level of consciousness: awake and alert  Airway patency: patent  Nausea & Vomiting: no nausea and no vomiting  Complications: no  Cardiovascular status: hemodynamically stable  Respiratory status: acceptable, nonlabored ventilation and spontaneous ventilation  Hydration status: euvolemic  Comments: BP (!) 111/57   Pulse 95   Temp 97.4 °F (36.3 °C) (Tympanic)   Resp 16   Wt 185 lb (83.9 kg)   SpO2 93%   BMI 30.79 kg/m²     Multimodal analgesia pain management approach

## 2022-12-09 NOTE — ANESTHESIA PROCEDURE NOTES
Peripheral Block    Patient location during procedure: pre-op  Reason for block: post-op pain management and at surgeon's request  Start time: 12/9/2022 10:15 AM  End time: 12/9/2022 10:19 AM  Staffing  Performed: anesthesiologist   Anesthesiologist: Judy Rowe MD  Preanesthetic Checklist  Completed: patient identified, IV checked, site marked, risks and benefits discussed, surgical/procedural consents, equipment checked, pre-op evaluation, timeout performed, anesthesia consent given, oxygen available, monitors applied/VS acknowledged and blood product R/B/A discussed and consented  Peripheral Block   Patient position: sitting  Prep: ChloraPrep  Provider prep: sterile gloves  Patient monitoring: cardiac monitor, continuous pulse ox, frequent blood pressure checks, IV access, oxygen and responsive to questions  Block type: Brachial plexus  Interscalene  Laterality: left  Injection technique: single-shot  Guidance: ultrasound guided  Local infiltration: lidocaine  Infiltration strength: 1 %  Local infiltration: lidocaine  Dose: 3 mL    Needle   Needle type: insulated echogenic nerve stimulator needle   Needle gauge: 20 G  Needle localization: ultrasound guidance  Needle length: 5 cm  Assessment   Injection assessment: negative aspiration for heme, no paresthesia on injection, local visualized surrounding nerve on ultrasound and no intravascular symptoms  Paresthesia pain: none  Slow fractionated injection: yes  Hemodynamics: stable  Real-time US image taken/store: yes  Outcomes: uncomplicated    Additional Notes  Risks and benefits of block discussed prior to sedation including the most likely scenario of complete block resolution within 24h, expected ipselateral diaphragm weakness, small risk of jennifer's syndrome, low chance of intravascular injection, low chance of damage to surrounding structures, unlikely ,but possible  risk of unexpected tingling, numbness or weakness persisting longer than 24h.  And the extremely rare adverse even of permanent weakness numbness or altered sensation. During this block C5 was identified under U/S and both  interscalene muscle bellies identified, medication was visualized surrounding nerve. No complications were immediately apparent following the block. Patient was monitored carefully following block for acute reactions or complications by nursing of anesthesia staff.   Medications Administered  bupivacaine (PF) 0.5 % - Perineural   20 mL - 12/9/2022 10:19:00 AM  dexamethasone 4 MG/ML - Perineural   4 mg - 12/9/2022 10:19:00 AM

## 2022-12-09 NOTE — INTERVAL H&P NOTE
Update History & Physical    The Patient's History and Physical was reviewed   I discussed the surgery and patients medical condition with the patient. The chart was reviewed with the patient and I examined the patient. There was no change from previous note. The surgical site was confirmed by the patient and me. CV: RRR  RESP: CTAB    Plan:  The risk, benefits, expected outcome, and alternative to the recommended procedure have been discussed with the patient. Patient understands and elects to proceed with the procedure as planned.     Electronically signed by Hailey Canchola MD on 12/09/22 10:08 AM

## 2022-12-10 ENCOUNTER — HOME CARE VISIT (OUTPATIENT)
Dept: SCHEDULING | Facility: HOME HEALTH | Age: 52
End: 2022-12-10
Payer: COMMERCIAL

## 2022-12-10 VITALS
RESPIRATION RATE: 16 BRPM | DIASTOLIC BLOOD PRESSURE: 68 MMHG | TEMPERATURE: 98.5 F | OXYGEN SATURATION: 94 % | SYSTOLIC BLOOD PRESSURE: 110 MMHG | HEART RATE: 90 BPM

## 2022-12-10 PROCEDURE — G0151 HHCP-SERV OF PT,EA 15 MIN: HCPCS

## 2022-12-10 ASSESSMENT — ENCOUNTER SYMPTOMS
DYSPNEA ACTIVITY LEVEL: AFTER AMBULATING MORE THAN 20 FT
CONSTIPATION: 1
PAIN LOCATION - PAIN QUALITY: PRESSURE

## 2022-12-12 ENCOUNTER — HOME CARE VISIT (OUTPATIENT)
Dept: SCHEDULING | Facility: HOME HEALTH | Age: 52
End: 2022-12-12
Payer: COMMERCIAL

## 2022-12-12 VITALS
RESPIRATION RATE: 16 BRPM | OXYGEN SATURATION: 97 % | DIASTOLIC BLOOD PRESSURE: 72 MMHG | HEART RATE: 88 BPM | TEMPERATURE: 98.3 F | SYSTOLIC BLOOD PRESSURE: 112 MMHG

## 2022-12-12 PROCEDURE — G0151 HHCP-SERV OF PT,EA 15 MIN: HCPCS

## 2022-12-12 ASSESSMENT — ENCOUNTER SYMPTOMS: PAIN LOCATION - PAIN QUALITY: PRESSURE

## 2022-12-14 ENCOUNTER — HOME CARE VISIT (OUTPATIENT)
Dept: SCHEDULING | Facility: HOME HEALTH | Age: 52
End: 2022-12-14
Payer: COMMERCIAL

## 2022-12-14 PROCEDURE — G0157 HHC PT ASSISTANT EA 15: HCPCS

## 2022-12-15 VITALS
HEART RATE: 90 BPM | RESPIRATION RATE: 16 BRPM | OXYGEN SATURATION: 96 % | DIASTOLIC BLOOD PRESSURE: 70 MMHG | SYSTOLIC BLOOD PRESSURE: 122 MMHG | TEMPERATURE: 97.6 F

## 2022-12-19 ENCOUNTER — HOME CARE VISIT (OUTPATIENT)
Dept: SCHEDULING | Facility: HOME HEALTH | Age: 52
End: 2022-12-19
Payer: COMMERCIAL

## 2022-12-19 PROCEDURE — G0157 HHC PT ASSISTANT EA 15: HCPCS

## 2022-12-20 ENCOUNTER — TELEPHONE (OUTPATIENT)
Dept: ORTHOPEDIC SURGERY | Age: 52
End: 2022-12-20

## 2022-12-20 ENCOUNTER — OFFICE VISIT (OUTPATIENT)
Dept: ORTHOPEDIC SURGERY | Age: 52
End: 2022-12-20

## 2022-12-20 VITALS
HEART RATE: 90 BPM | RESPIRATION RATE: 16 BRPM | SYSTOLIC BLOOD PRESSURE: 124 MMHG | DIASTOLIC BLOOD PRESSURE: 70 MMHG | OXYGEN SATURATION: 98 % | TEMPERATURE: 97.4 F

## 2022-12-20 DIAGNOSIS — Z09 S/P ORTHOPEDIC SURGERY, FOLLOW-UP EXAM: Primary | ICD-10-CM

## 2022-12-20 PROCEDURE — 99024 POSTOP FOLLOW-UP VISIT: CPT | Performed by: ORTHOPAEDIC SURGERY

## 2022-12-20 ASSESSMENT — ENCOUNTER SYMPTOMS: PAIN LOCATION - PAIN QUALITY: SORE.ACHES

## 2022-12-20 NOTE — LETTER
[address] [place] [address],[facility] [address]  Phone: [phone]  Fax: [phone]    Bess Astorga MD        December 20, 2022     Patient: Dora Vee   YOB: 1970   Date of Visit: 12/20/2022       To Whom It May Concern:    Appointment date:  December 20, 2022    Dora Vee was in our office for an appointment on the date listed above. Please excuse him/her from work on this day. Please see notes regarding work status:    - out of work until next appointment    Follow-up appointment: 4 weeks     If you have any questions or concerns, please don't hesitate to call.     Sincerely,        Bess Astorga MD

## 2022-12-20 NOTE — PROGRESS NOTES
Name: Hamilton Mehta  YOB: 1970  Gender: female  MRN: [id number]    Procedure Performed: 1) left  Shoulder Arthroscopic Rotator Cuff Repair  2)  left shoulder arthroscopy with distal clavicle resection  3) left shoulder arthroscopy with debridement including biceps tenotomy, labrum tear, glenohumeral capsule   4) left  Shoulder Arthroscopy with subacromial decompression      Date of Procedure: 12/9/22      Subjective:Returns 2 weeks status post the above procedure. Left SAD,  DCR, RCR and bicep tenotomy. DOS 12/09/2022 doing well. Pain is controlled      Physical Examination:Incisions clean dry and intact, no sign of infection. Motor and sensory intact. Tolerates gentle rotation or side. Passive elevation about 70 degrees. Assessment:   1. S/P orthopedic surgery, follow-up exam         Plan:   Doing well. She will be out of work until next follow-up. Continue PT per  Less than 3 cm rotator cuff repair and biceps tenotomy protocol.   Follow up in 4 weeks

## 2022-12-22 ENCOUNTER — HOME CARE VISIT (OUTPATIENT)
Dept: SCHEDULING | Facility: HOME HEALTH | Age: 52
End: 2022-12-22
Payer: COMMERCIAL

## 2022-12-22 PROCEDURE — G0157 HHC PT ASSISTANT EA 15: HCPCS

## 2022-12-23 VITALS
SYSTOLIC BLOOD PRESSURE: 102 MMHG | HEART RATE: 79 BPM | TEMPERATURE: 97.8 F | DIASTOLIC BLOOD PRESSURE: 60 MMHG | OXYGEN SATURATION: 98 % | RESPIRATION RATE: 16 BRPM

## 2022-12-28 ENCOUNTER — HOME CARE VISIT (OUTPATIENT)
Dept: SCHEDULING | Facility: HOME HEALTH | Age: 52
End: 2022-12-28
Payer: COMMERCIAL

## 2022-12-28 VITALS
DIASTOLIC BLOOD PRESSURE: 70 MMHG | TEMPERATURE: 97.3 F | HEART RATE: 90 BPM | RESPIRATION RATE: 16 BRPM | OXYGEN SATURATION: 96 % | SYSTOLIC BLOOD PRESSURE: 124 MMHG

## 2022-12-28 PROCEDURE — G0157 HHC PT ASSISTANT EA 15: HCPCS

## 2022-12-29 ASSESSMENT — ENCOUNTER SYMPTOMS: PAIN LOCATION - PAIN QUALITY: SORE.ACHES

## 2022-12-30 ENCOUNTER — HOME CARE VISIT (OUTPATIENT)
Dept: SCHEDULING | Facility: HOME HEALTH | Age: 52
End: 2022-12-30
Payer: COMMERCIAL

## 2022-12-30 VITALS
SYSTOLIC BLOOD PRESSURE: 125 MMHG | TEMPERATURE: 98.4 F | RESPIRATION RATE: 16 BRPM | HEART RATE: 94 BPM | OXYGEN SATURATION: 96 % | DIASTOLIC BLOOD PRESSURE: 72 MMHG

## 2022-12-30 PROCEDURE — G0151 HHCP-SERV OF PT,EA 15 MIN: HCPCS

## 2023-01-03 ENCOUNTER — HOME CARE VISIT (OUTPATIENT)
Dept: SCHEDULING | Facility: HOME HEALTH | Age: 53
End: 2023-01-03
Payer: COMMERCIAL

## 2023-01-03 PROCEDURE — G0157 HHC PT ASSISTANT EA 15: HCPCS

## 2023-01-04 VITALS
OXYGEN SATURATION: 97 % | RESPIRATION RATE: 16 BRPM | SYSTOLIC BLOOD PRESSURE: 122 MMHG | TEMPERATURE: 97.1 F | DIASTOLIC BLOOD PRESSURE: 78 MMHG | HEART RATE: 80 BPM

## 2023-01-06 ENCOUNTER — TELEPHONE (OUTPATIENT)
Dept: ORTHOPEDIC SURGERY | Age: 53
End: 2023-01-06

## 2023-01-06 ENCOUNTER — HOME CARE VISIT (OUTPATIENT)
Dept: SCHEDULING | Facility: HOME HEALTH | Age: 53
End: 2023-01-06
Payer: COMMERCIAL

## 2023-01-10 ENCOUNTER — HOME CARE VISIT (OUTPATIENT)
Dept: SCHEDULING | Facility: HOME HEALTH | Age: 53
End: 2023-01-10
Payer: COMMERCIAL

## 2023-01-10 VITALS
DIASTOLIC BLOOD PRESSURE: 70 MMHG | OXYGEN SATURATION: 98 % | RESPIRATION RATE: 16 BRPM | HEART RATE: 90 BPM | SYSTOLIC BLOOD PRESSURE: 124 MMHG | TEMPERATURE: 97.6 F

## 2023-01-10 PROCEDURE — G0157 HHC PT ASSISTANT EA 15: HCPCS

## 2023-01-12 ENCOUNTER — HOME CARE VISIT (OUTPATIENT)
Dept: SCHEDULING | Facility: HOME HEALTH | Age: 53
End: 2023-01-12
Payer: COMMERCIAL

## 2023-01-12 VITALS
HEART RATE: 85 BPM | RESPIRATION RATE: 16 BRPM | TEMPERATURE: 97 F | DIASTOLIC BLOOD PRESSURE: 64 MMHG | OXYGEN SATURATION: 99 % | SYSTOLIC BLOOD PRESSURE: 110 MMHG

## 2023-01-12 PROCEDURE — G0157 HHC PT ASSISTANT EA 15: HCPCS

## 2023-01-17 ENCOUNTER — OFFICE VISIT (OUTPATIENT)
Dept: ORTHOPEDIC SURGERY | Age: 53
End: 2023-01-17

## 2023-01-17 ENCOUNTER — HOME CARE VISIT (OUTPATIENT)
Dept: SCHEDULING | Facility: HOME HEALTH | Age: 53
End: 2023-01-17
Payer: COMMERCIAL

## 2023-01-17 DIAGNOSIS — M25.512 LEFT SHOULDER PAIN, UNSPECIFIED CHRONICITY: ICD-10-CM

## 2023-01-17 DIAGNOSIS — Z09 S/P ORTHOPEDIC SURGERY, FOLLOW-UP EXAM: Primary | ICD-10-CM

## 2023-01-17 PROCEDURE — G0157 HHC PT ASSISTANT EA 15: HCPCS

## 2023-01-17 ASSESSMENT — ENCOUNTER SYMPTOMS: PAIN LOCATION - PAIN QUALITY: ACHES

## 2023-01-17 NOTE — PROGRESS NOTES
Name: Venkat Farah  YOB: 1970  Gender: female  MRN: [id number]    Procedure Performed: 1) left  Shoulder Arthroscopic Rotator Cuff Repair  2)  left shoulder arthroscopy with distal clavicle resection  3) left shoulder arthroscopy with debridement including biceps tenotomy, labrum tear, glenohumeral capsule   4) left  Shoulder Arthroscopy with subacromial decompression      Date of Procedure: 12/9/2022      Subjective:Returns 6 weeks status post the above procedure. Pt reports she has been experiencing swelling in her hand accompanied by paresthesia. She also noted a sensation of pressure anteriorly within her shoulder. She has been working with home health physical therapy but has not been outpatient therapy to this point. Physical Examination:Incisions clean dry and intact, no sign of infection. Motor and sensory intact. She has some vague dysesthesia type feelings in the back of her fingers and hand but no objective numbness. She is able to demonstrate full motor function. Passive elevation I can take her up to about 110. Passive external rotation about 30. Assessment:   1. S/P orthopedic surgery, follow-up exam    2. Left shoulder pain, unspecified chronicity         Plan:   She is progressing slowly I think some of the swelling and numbness could likely just be positional versus potentially residual symptoms from the interscalene block. I do think she would get much more benefit from outpatient physical therapy which we encouraged her to attend and we ordered today. She can have range of motion as tolerated at this point and transition out of the sling. I will plan to see her back in about 5 weeks at which time we can consider return to work with restrictions. She will be out of work in the meantime.

## 2023-01-18 NOTE — CASE COMMUNICATION
patient saw Juanpablo Dyson today and now has a referral for outpatient.  patient deciding where to go . has referral.

## 2023-01-19 ENCOUNTER — HOME CARE VISIT (OUTPATIENT)
Dept: SCHEDULING | Facility: HOME HEALTH | Age: 53
End: 2023-01-19
Payer: COMMERCIAL

## 2023-01-19 VITALS
SYSTOLIC BLOOD PRESSURE: 120 MMHG | OXYGEN SATURATION: 96 % | HEART RATE: 86 BPM | DIASTOLIC BLOOD PRESSURE: 78 MMHG | RESPIRATION RATE: 16 BRPM | TEMPERATURE: 97.8 F

## 2023-01-19 PROCEDURE — G0151 HHCP-SERV OF PT,EA 15 MIN: HCPCS

## 2023-01-19 ASSESSMENT — ENCOUNTER SYMPTOMS: PAIN LOCATION - PAIN QUALITY: HEAVINESS

## 2023-01-23 ENCOUNTER — CLINICAL DOCUMENTATION (OUTPATIENT)
Dept: PHYSICAL THERAPY | Age: 53
End: 2023-01-23

## 2023-01-23 NOTE — THERAPY DISCHARGE
[address] [facility],[address] Floor @ [facility]  [place] [address]  Phone: [phone]  Fax: [phone]    OUTPATIENT PHYSICAL THERAPY  Discontinuation Summary 1/23/2023  Episode  Appt Desk         Rakel Johnson was seen in physical therapy for 7  visits from 6/29/22 to 9/8/22, with 2 cancellations and 3 no shows.  Ms. Connie Balderas therapy has come to an end at this time due to: Patient did not return for/schedule additional treatment    Physical Therapy Goals:  Not Met: Reasons for goals not being achieved: pt did not return for additional follow-ups    Raf Carrasquillo PT  .

## 2023-01-25 ENCOUNTER — HOSPITAL ENCOUNTER (OUTPATIENT)
Dept: PHYSICAL THERAPY | Age: 53
Setting detail: RECURRING SERIES
Discharge: HOME | End: 2023-01-28
Payer: COMMERCIAL

## 2023-01-25 PROCEDURE — 97161 PT EVAL LOW COMPLEX 20 MIN: CPT

## 2023-01-25 PROCEDURE — 97110 THERAPEUTIC EXERCISES: CPT

## 2023-01-25 NOTE — PLAN OF CARE
Guillermo Snow  : 1970  Primary: Generic [employer] ([employer]'s [employer])  Secondary:  [address] @ [facility]  [address] [facility] [address]  Phone: [phone]  Fax: [phone] Plan Frequency: 2x per week for 12 weeks    Plan of Care/Certification Expiration Date: 23      PT Visit Info:  Plan Frequency: 2x per week for 12 weeks  Plan of Care/Certification Expiration Date: 23      Visit Count:  1                OUTPATIENT PHYSICAL THERAPY:             OP NOTE TYPE: Initial Assessment 2023               Episode (RCR) Appt Desk         Treatment Diagnosis:  Pain in Left Shoulder (M25.512)  Stiffness of Left Shoulder, Not elsewhere classified (M25.612)  Medical/Referring Diagnosis:  S/P orthopedic surgery, follow-up exam [Z09]  Left shoulder pain, unspecified chronicity [M25.512]  Referring Physician:  Yousif Figueroa MD MD Orders:  PT Eval and Treat   Return MD Appt:  unknown at this time  Date of Onset:  Onset Date: 22      Allergies:  Patient has no known allergies. Restrictions/Precautions:    Restrictions/Precautions: Other (comment) (ROM and strengthening per MD protocol)      Medications Last Reviewed:  2023     SUBJECTIVE   History of Injury/Illness (Reason for Referral):  Pt originally began to feel left shoulder pain after receiving Covid vaccination. She struggled with pain and loss of ROM for roughly a year. In , she was referred to outpatient therapy for frozen shoulder. She was able to have some increased in motion and strength, but still had ongoing pain. She underwent cuff repair on 22 along with labrum debridement, biceps tenotomy. Patient Stated Goal(s):  \"to decrease pain, increase motion and strength\"  Initial:     8/10 Post Session:     8/10  Past Medical History/Comorbidities:   Ms. Colby Gandhi  has a past medical history of Tachycardia.   Ms. Colby Gandhi  has a past surgical history that includes Shoulder arthroscopy (Left, 12/9/2022). Social History/Living Environment:   Lives With: Spouse  Type of Home: House     Prior Level of Function/Work/Activity:   Prior level of function: Independent         Learning:   Does the patient/guardian have any barriers to learning?: No barriers  Will there be a co-learner?: No  What is the preferred language of the patient/guardian?: English  Is an  required?: No  How does the patient/guardian prefer to learn new concepts?: Listening; Reading; Demonstration; Pictures/Videos     Fall Risk Scale: Tobar Total Score: 20  Tobar Fall Risk: Low (0-24)           OBJECTIVE   UE ROM:       R UE   LE UE   Flex 137 °     80 °     Abd 160 °   75 °     ER 75 °   25 °     IR 80 °   30 °           UE strength measures:     R UE L UE   Flex  5 /5   NT/5   Abduction   5/5   NT/5   ER   5/5   NT/5   IR  5 /5  NT /5   biceps  5 /5  NT /5   triceps   5/5  NT /5       ASSESSMENT   Initial Assessment:  Pt originally began to feel left shoulder pain after receiving Covid vaccination. She struggled with pain and loss of ROM for roughly a year. In June, she was referred to outpatient therapy for frozen shoulder. She was able to have some increase in motion and strength, but still had ongoing pain. She underwent cuff repair on 12/9/22 along with labrum debridement, biceps tenotomy. She has had home health and is ready for outpatient therapy. She is tentative with motion and fearful to move. Problem List: (Impacting functional limitations): Body Structures, Functions, Activity Limitations Requiring Skilled Therapeutic Intervention: Decreased ADL status; Decreased ROM; Decreased tolerance to work activity; Decreased strength;  Increased pain     Therapy Prognosis:   Therapy Prognosis: Good     Initial Assessment Complexity:   Decision Making: Low Complexity    PLAN   Effective Dates: 1/25/23 TO Plan of Care/Certification Expiration Date: 04/25/23     Frequency/Duration: Plan Frequency: 2x per week for 12 weeks     Interventions Planned (Treatment may consist of any combination of the following):    Current Treatment Recommendations: Strengthening; ROM; ADL/Self-care training; IADL training; Neuromuscular re-education; Manual; Pain management; Return to work related activity; Home exercise program; Modalities; Therapeutic activities     Goals: (Goals have been discussed and agreed upon with patient.)  Short-Term Functional Goals: Time Frame: 6 weeks    Pt will be independent with HEP. Pt will be able to gain 10-20 degrees of active shoulder motion for improved reaching. Pt will be able to use the UE for her ADL's to include brushing teeth, dressing, styling hair, opening doors. Discharge Goals: Time Frame: 12 weeks    Pt will be able to gain full/functional ROM for reaching in all directions. Pt will be able to report sleeping on shoulder for 1-2 hours per night. Pt will be able to have 4/5 to 4+/5 strength needed for lifting grandchildren, household objects. Pt will be able to place objects on overhead shelves. Pt will increase score on Sarah by at least 20 points. Outcome Measure: Tool Used: SARAH Shoulder Score  Initial Score: 6/100 Most Recent: X/100 (Date: XX/XX)   Interpretation of Score: 20 questions each scored on a 3 point scale with 0 representing \"extreme difficulty or unable to perform\" and 3 representing \"no difficulty\", 3 questions each scored from 0-10 about pain, and 1 question scored 0-10 about function of the shoulder  The lower the score, the greater the functional disability. 100/100 represents no disability, pain or loss of function. Minimal clinically important difference is 11.4. Minimal detectable change is 12.1. Medical Necessity:   > Skilled intervention continues to be required due to lack of full shoulder motion and strength needed for reaching and lifting.   Reason For Services/Other Comments:  > Patient continues to require skilled intervention due to lack of full shoulder motion and strength needed for reaching and lifting.  Total Duration:       Regarding Fina LEOLA Young's therapy, I certify that the treatment plan above will be carried out by a therapist or under their direction.  Thank you for this referral,  Chetna Lee, PT     Referring Physician Signature: Heladio Rodriguez MD No Signature is Required for this note.        Post Session Pain  Charge Capture  PT Visit Info MD Guidelines  MyChart

## 2023-01-25 NOTE — PROGRESS NOTES
Oanh Sanchez  : 1970  Primary: Generic [employer] (Worker's Comp)  Secondary:  [address] @ [facility]  [address] [facility] [address]  Phone: [phone]  Fax: [phone] Plan Frequency: 2x per week for 12 weeks    Plan of Care/Certification Expiration Date: 23      PT Visit Info:  Plan Frequency: 2x per week for 12 weeks  Plan of Care/Certification Expiration Date: 23      Visit Count:  1    OUTPATIENT PHYSICAL THERAPY:OP NOTE TYPE: Treatment Note 2023       Episode  }Appt Desk             Treatment Diagnosis:  Pain in Left Shoulder (M25.512)  Stiffness of Left Shoulder, Not elsewhere classified (M25.612)  Medical/Referring Diagnosis:  S/P orthopedic surgery, follow-up exam [Z09]  Left shoulder pain, unspecified chronicity [M25.512]  Referring Physician:  Pauline Meigs, MD MD Orders:  PT Eval and Treat   Date of Onset:  Onset Date: 22     Allergies:   Patient has no known allergies. Restrictions/Precautions:  Restrictions/Precautions: Other (comment) (ROM and strengthening per MD protocol)  No data recorded     Interventions Planned (Treatment may consist of any combination of the following):    Current Treatment Recommendations: Strengthening; ROM; ADL/Self-care training; IADL training; Neuromuscular re-education; Manual; Pain management; Return to work related activity; Home exercise program; Modalities; Therapeutic activities     Subjective Comments:  Pt reports pain, hesitancy to move the shoulder/arm  Initial:}    8/10Post Session:       8/10  Medications Last Reviewed:  2023  Updated Objective Findings:  See evaluation note from today  Treatment   THERAPEUTIC EXERCISE: (30 minutes):    Exercises per grid below to improve mobility and strength. Required minimal verbal cues to promote proper body mechanics. Progressed resistance, range, and repetitions as indicated.   Supine PROM in all directions  Supine punches with wand x 10  Seated table slides for flexion, abduction x 15  Biceps curls with wand x 15  Pulleys with assistance x 10  Shoulder walkouts x 15  MANUAL THERAPY: ( minutes):   Joint mobilization and Soft tissue mobilization was utilized and necessary because of the patient's restricted joint motion and restricted motion of soft tissue. Treatment/Session Summary:    Treatment Assessment:  Pt very fearful to move the shoulder. Did require some cuing to relax the neck regionto avoid shoulder hiking. She has limited ROM and strength  Communication/Consultation:  None today  Equipment provided today:  None  Recommendations/Intent for next treatment session: Next visit will focus on continued ROM, strengthening.     Total Treatment Billable Duration:  45 minutes  Time In: 3304  Time Out: SHAMEKA Klein       Charge Capture  }Post Session Pain  PT Visit Info  [facility] Portal  MD Guidelines  Scanned Media  Benefits  MyChart    Future Appointments   Date Time Provider Gayle Devlin   1/30/2023  8:45 AM Jani Sanders, PT [facility]   2/1/2023  8:45 AM Jani Sanders, PT [facility]   2/6/2023 11:45 AM Jani Sanders, PT [facility]   2/8/2023  8:45 AM Jani Sanders, PT [facility]   2/13/2023 11:45 AM Jani Sanders, PT [facility]   2/15/2023  8:45 AM Jani Sanders, PT [facility]   2/28/2023 10:30 AM Syeda Solomon MD [facility]

## 2023-01-26 ASSESSMENT — PAIN SCALES - GENERAL: PAINLEVEL_OUTOF10: 8

## 2023-01-30 ENCOUNTER — HOSPITAL ENCOUNTER (OUTPATIENT)
Dept: PHYSICAL THERAPY | Age: 53
Setting detail: RECURRING SERIES
Discharge: HOME | End: 2023-02-02
Payer: COMMERCIAL

## 2023-01-30 PROCEDURE — 97110 THERAPEUTIC EXERCISES: CPT

## 2023-01-30 ASSESSMENT — PAIN SCALES - GENERAL: PAINLEVEL_OUTOF10: 7

## 2023-01-30 NOTE — PROGRESS NOTES
Bridget Esteves  : 1970  Primary: Generic [employer] (Worker's Comp)  Secondary:  [address] @ [facility]  Therapy  [address] [place] [address]  Phone: [phone]  Fax: [phone] Plan Frequency: 2x per week for 12 weeks    Plan of Care/Certification Expiration Date: 23      PT Visit Info:  Plan Frequency: 2x per week for 12 weeks  Plan of Care/Certification Expiration Date: 23      Visit Count:  2    OUTPATIENT PHYSICAL THERAPY:OP NOTE TYPE: Treatment Note 2023       Episode  }Appt Desk             Treatment Diagnosis:  Pain in Left Shoulder (M25.512)  Stiffness of Left Shoulder, Not elsewhere classified (M25.612)  Medical/Referring Diagnosis:  S/P orthopedic surgery, follow-up exam [Z09]  Left shoulder pain, unspecified chronicity [M25.512]  Referring Physician:  Jennifer Banuelos MD MD Orders:  PT Eval and Treat   Date of Onset:  Onset Date: 22     Allergies:   Patient has no known allergies. Restrictions/Precautions:  Restrictions/Precautions: Other (comment) (ROM and strengthening per MD protocol)  No data recorded     Interventions Planned (Treatment may consist of any combination of the following):    Current Treatment Recommendations: Strengthening; ROM; ADL/Self-care training; IADL training; Neuromuscular re-education; Manual; Pain management; Return to work related activity; Home exercise program; Modalities; Therapeutic activities     Subjective Comments:  Pt reporting the shoulder is improving slowly. She has noticed a burning in the hand  Initial:}    7/10Post Session:       7/10  Medications Last Reviewed:  2023  Updated Objective Findings:  None Today  Treatment   THERAPEUTIC EXERCISE: (45 minutes):    Exercises per grid below to improve mobility and strength. Required minimal verbal cues to promote proper body mechanics. Progressed resistance, range, and repetitions as indicated.   Supine PROM in all directions  Supine punches with wand x 10-held  Biceps curls 2#  2 x 10  Pulleys with assistance 2 x 10  Shoulder rows yellow x 20  Bent over rows 2# x 20  Punches yellow x 20  Supine wand flexion x 20  Supine wand ER x 20  MANUAL THERAPY: ( minutes):   Joint mobilization and Soft tissue mobilization was utilized and necessary because of the patient's restricted joint motion and restricted motion of soft tissue. Treatment/Session Summary:    Treatment Assessment:  Pt reports feeling fairly well overall during treatment. Mild soreness only. She was less guarded today and able to move the arm through a better ROM  Communication/Consultation:  None today  Equipment provided today:  None  Recommendations/Intent for next treatment session: Next visit will focus on continued ROM, strengthening.     Total Treatment Billable Duration:  45 minutes  Time In: 0845  Time Out: [address] [facility], PT       Charge Capture  }Post Session Pain  PT Visit Info  [employer] Portal  MD Guidelines  Scanned Media  Benefits  MyChart    Future Appointments   Date Time Provider Gayle Devlin   2/1/2023  8:45 AM Patrice December, PT [facility]   2/6/2023 11:45 AM Charlotte December, PT [facility]   2/8/2023  8:45 AM Charlotte December, PT [facility]   2/13/2023 11:45 AM Charlotte December, PT [facility]   2/15/2023  8:45 AM Charlotte December, PT [facility]   2/28/2023 10:30 AM Margarita Gorman MD [facility]

## 2023-02-01 ENCOUNTER — HOSPITAL ENCOUNTER (OUTPATIENT)
Dept: PHYSICAL THERAPY | Age: 53
Setting detail: RECURRING SERIES
End: 2023-02-01
Payer: COMMERCIAL

## 2023-02-01 ENCOUNTER — TELEPHONE (OUTPATIENT)
Dept: ORTHOPEDIC SURGERY | Age: 53
End: 2023-02-01
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
## 2023-02-06 ENCOUNTER — HOSPITAL ENCOUNTER (OUTPATIENT)
Dept: PHYSICAL THERAPY | Age: 53
Setting detail: RECURRING SERIES
End: 2023-02-06
Payer: COMMERCIAL

## 2023-02-06 NOTE — PROGRESS NOTES
Shannan Ratliff  : 1970  Primary: [employer] *  Secondary:  [address] @ [facility]  [address] [place] [address]  Phone: [phone]  Fax: [phone] Plan Frequency: 2x per week for 12 weeks    Plan of Care/Certification Expiration Date: 23      PT Visit Info:  No data recorded       OUTPATIENT PHYSICAL THERAPY 2023     Appt Desk   Episode   MyChart      Pt called to cancel appointments this week due to illness- Covid    Future Appointments   Date Time Provider Gayle Devlin   2023 11:45 AM Inocencio Wood, PT [facility]   2023  7:00 PM Inocencio Wood, PT [facility]   2023 11:45 AM Inocencio Wood, PT [facility]   2/15/2023  8:45 AM Inocencio Wood, PT [facility]   2023 10:30 AM Ayla Ramos MD [facility]

## 2023-02-08 ENCOUNTER — APPOINTMENT (OUTPATIENT)
Dept: PHYSICAL THERAPY | Age: 53
End: 2023-02-08
Payer: COMMERCIAL

## 2023-02-13 ENCOUNTER — HOSPITAL ENCOUNTER (OUTPATIENT)
Dept: PHYSICAL THERAPY | Age: 53
Setting detail: RECURRING SERIES
Discharge: HOME | End: 2023-02-16
Payer: COMMERCIAL

## 2023-02-13 PROCEDURE — 97110 THERAPEUTIC EXERCISES: CPT

## 2023-02-13 ASSESSMENT — PAIN SCALES - GENERAL: PAINLEVEL_OUTOF10: 6

## 2023-02-13 NOTE — PROGRESS NOTES
Hany Sepulveda  : 1970  Primary: [employer] ([employer]'s [employer])  Secondary:  [address] @ [facility]  [facility] [place] [address]  Phone: [phone]  Fax: [phone] Plan Frequency: 2x per week for 12 weeks    Plan of Care/Certification Expiration Date: 23      PT Visit Info:  Plan Frequency: 2x per week for 12 weeks  Plan of Care/Certification Expiration Date: 23      Visit Count:  3    OUTPATIENT PHYSICAL THERAPY:OP NOTE TYPE: Treatment Note 2023       Episode  }Appt Desk             Treatment Diagnosis:  Pain in Left Shoulder (M25.512)  Stiffness of Left Shoulder, Not elsewhere classified (M25.612)  Medical/Referring Diagnosis:  S/P orthopedic surgery, follow-up exam [Z09]  Left shoulder pain, unspecified chronicity [M25.512]  Referring Physician:  July Avila MD MD Orders:  PT Eval and Treat   Date of Onset:  Onset Date: 22     Allergies:   Patient has no known allergies. Restrictions/Precautions:  Restrictions/Precautions: Other (comment) (ROM and strengthening per MD protocol)  No data recorded     Interventions Planned (Treatment may consist of any combination of the following):    Current Treatment Recommendations: Strengthening; ROM; ADL/Self-care training; IADL training; Neuromuscular re-education; Manual; Pain management; Return to work related activity; Home exercise program; Modalities; Therapeutic activities     Subjective Comments:  Pt reports feeling soreness in the shoulder and some burning and tingling in the 3 and 4 fingers  Initial:}    6/10Post Session:       6/10  Medications Last Reviewed:  2023  Updated Objective Findings:  None Today  Treatment   THERAPEUTIC EXERCISE: (45 minutes):    Exercises per grid below to improve mobility and strength. Required minimal verbal cues to promote proper body mechanics. Progressed resistance, range, and repetitions as indicated.   UBE x 5 min  Biceps curls 2#  2 x 10-held  Pulleys with assistance 2 x 10  Shoulder rows yellow x 20  Bent over rows 2# x 20-held  Punches yellow x 20  Supine wand flexion x 20  Supine wand ER x 20  Wall washing x 20  PROM in supine in all directions    \  MANUAL THERAPY: ( minutes):   Joint mobilization and Soft tissue mobilization was utilized and necessary because of the patient's restricted joint motion and restricted motion of soft tissue. Treatment/Session Summary:    Treatment Assessment:  Pt guarded at times and hesitant to move. Reassured pt it was okay to move the arm for light ADL's around the house. She reached roughly 90 degrees for passive flexion and abduction  Communication/Consultation:  None today  Equipment provided today:  None  Recommendations/Intent for next treatment session: Next visit will focus on continued ROM, strengthening.     Total Treatment Billable Duration:  45 minutes  Time In: 9149  Time Out: [address], PT       Charge Capture  }Post Session Pain  PT Visit Info  [employer] Portal  MD Guidelines  Scanned Media  Benefits  MyChart    Future Appointments   Date Time Provider [facility] [place]   2/15/2023  8:45 AM Natalie Osman PT [facility]   2/28/2023 10:30 AM Lily Hobbs MD [facility]

## 2023-02-15 ENCOUNTER — HOSPITAL ENCOUNTER (OUTPATIENT)
Dept: PHYSICAL THERAPY | Age: 53
Setting detail: RECURRING SERIES
Discharge: HOME | End: 2023-02-18
Payer: COMMERCIAL

## 2023-02-15 PROCEDURE — 97110 THERAPEUTIC EXERCISES: CPT

## 2023-02-15 PROCEDURE — 97140 MANUAL THERAPY 1/> REGIONS: CPT

## 2023-02-15 ASSESSMENT — PAIN SCALES - GENERAL: PAINLEVEL_OUTOF10: 6

## 2023-02-15 NOTE — PROGRESS NOTES
[email or web address]  : 1970  Primary: Generic [employer] (Worker's Comp)  Secondary:  [address] @ [facility]  [address] Ernestina Bautista [place] [address]  Phone: [phone]  Fax: [phone] Plan Frequency: 2x per week for 12 weeks    Plan of Care/Certification Expiration Date: 23      PT Visit Info:  Plan Frequency: 2x per week for 12 weeks  Plan of Care/Certification Expiration Date: 23      Visit Count:  4    OUTPATIENT PHYSICAL THERAPY:OP NOTE TYPE: Treatment Note 2/15/2023       Episode  }Appt Desk             Treatment Diagnosis:  Pain in Left Shoulder (M25.512)  Stiffness of Left Shoulder, Not elsewhere classified (M25.612)  Medical/Referring Diagnosis:  S/P orthopedic surgery, follow-up exam [Z09]  Left shoulder pain, unspecified chronicity [M25.512]  Referring Physician:  Anjana Carter MD MD Orders:  PT Eval and Treat   Date of Onset:  Onset Date: 22     Allergies:   Patient has no known allergies. Restrictions/Precautions:  Restrictions/Precautions: Other (comment) (ROM and strengthening per MD protocol)  No data recorded     Interventions Planned (Treatment may consist of any combination of the following):    Current Treatment Recommendations: Strengthening; ROM; ADL/Self-care training; IADL training; Neuromuscular re-education; Manual; Pain management; Return to work related activity; Home exercise program; Modalities; Therapeutic activities     Subjective Comments:  Pt reports she is sore. Hand still bothers her  Initial:}    6/10Post Session:       5/10  Medications Last Reviewed:  2/15/2023  Updated Objective Findings:  None Today  Treatment   THERAPEUTIC EXERCISE: (45 minutes):    Exercises per grid below to improve mobility and strength. Required minimal verbal cues to promote proper body mechanics. Progressed resistance, range, and repetitions as indicated.   UBE x 5 min  Biceps curls 3#  2 x 10-  Pulleys with assistance 2 x 10  Shoulder rows yellow x 20  Bent over rows 2# x 20-held  Punches yellow x 20  Supine wand flexion x 20  Supine wand ER x 20  Wall washing x 20 for flexion, scaption  PROM in supine in all directions  Putter extension x 10 hold 3-5 sec  Shoulder flexion place and holds in standing x 10 hold 3 seconds  Supine punches x 25  Supine flexion with hands clasped x 20    MANUAL THERAPY: ( minutes):   Joint mobilization and Soft tissue mobilization was utilized and necessary because of the patient's restricted joint motion and restricted motion of soft tissue. Treatment/Session Summary:    Treatment Assessment:  Pt reports feeling sore in the shoulder. Very hesitant to move at times and must be encouraged to move and use the shoulder for ADL's  Communication/Consultation:  None today  Equipment provided today:  None  Recommendations/Intent for next treatment session: Next visit will focus on continued ROM, strengthening.     Total Treatment Billable Duration:  45 minutes  Time In: 0845  Time Out: [address] [facility], PT       Charge Capture  }Post Session Pain  PT Visit Info  [employer] Portal  MD Guidelines  Scanned Media  Benefits  MyChart    Future Appointments   Date Time Provider Gyale Devlin   2/27/2023 12:30 PM Prabhu Elliott PT [facility]   2/28/2023 10:30 AM Jeremy Brand MD [facility]   3/2/2023 12:30 PM Prabhu Elliott PT [facility]   3/6/2023 12:30 PM Prabhu Elliott PT [facility]   3/9/2023 12:30 PM Prabhu Elliott PT [facility]

## 2023-02-22 ENCOUNTER — HOSPITAL ENCOUNTER (OUTPATIENT)
Dept: PHYSICAL THERAPY | Age: 53
Setting detail: RECURRING SERIES
Discharge: HOME | End: 2023-02-25
Payer: COMMERCIAL

## 2023-02-22 PROCEDURE — 97110 THERAPEUTIC EXERCISES: CPT

## 2023-02-22 ASSESSMENT — PAIN SCALES - GENERAL: PAINLEVEL_OUTOF10: 6

## 2023-02-22 NOTE — PROGRESS NOTES
Brendan Cranker  : 1970  Primary: [employer] (Worker's [employer])  Secondary:  [address] @ [facility]  [address] Corey Barney [place] [address]  Phone: [phone]  Fax: [phone] Plan Frequency: 2x per week for 12 weeks    Plan of Care/Certification Expiration Date: 23      PT Visit Info:  Plan Frequency: 2x per week for 12 weeks  Plan of Care/Certification Expiration Date: 23      Visit Count:  5    OUTPATIENT PHYSICAL THERAPY:OP NOTE TYPE: Treatment Note 2023       Episode  }Appt Desk             Treatment Diagnosis:  Pain in Left Shoulder (M25.512)  Stiffness of Left Shoulder, Not elsewhere classified (M25.612)  Medical/Referring Diagnosis:  S/P orthopedic surgery, follow-up exam [Z09]  Left shoulder pain, unspecified chronicity [M25.512]  Referring Physician:  Collette Brocks, MD MD Orders:  PT Eval and Treat   Date of Onset:  Onset Date: 22     Allergies:   Patient has no known allergies. Restrictions/Precautions:  Restrictions/Precautions: Other (comment) (ROM and strengthening per MD protocol)  No data recorded     Interventions Planned (Treatment may consist of any combination of the following):    Current Treatment Recommendations: Strengthening; ROM; ADL/Self-care training; IADL training; Neuromuscular re-education; Manual; Pain management; Return to work related activity; Home exercise program; Modalities; Therapeutic activities     Subjective Comments:  Pt reports the arm is sore at times. Still having issues with her 3 and 4th finger. Today it appeared normal in appearance  Initial:}    6/10Post Session:       5/10  Medications Last Reviewed:  2023  Updated Objective Findings:  None Today  Treatment   THERAPEUTIC EXERCISE: (45 minutes):    Exercises per grid below to improve mobility and strength. Required minimal verbal cues to promote proper body mechanics. Progressed resistance, range, and repetitions as indicated.   UBE x 5 min  Biceps curls 3#  2 x 10-held  Pulleys with assistance 2 x 15  Shoulder rows yellow x 25  Bent over rows 4# x 20  Punches yellow x 25  Standing wand flexion x 10  Wall washing x 20 for flexion, scaption  PROM in supine in all directions  Putter extension x 10 hold 3-5 sec-held  Shoulder flexion place and holds in standing x 10 hold 3 seconds  Supine punches x 25-held  Supine flexion 1# with assistance x 20  Shoulder IR yellow TB x 20  Prone extension x 20  S/L ER x 20    MANUAL THERAPY: ( minutes):   Joint mobilization and Soft tissue mobilization was utilized and necessary because of the patient's restricted joint motion and restricted motion of soft tissue. Treatment/Session Summary:    Treatment Assessment:  Pt progressing overall, but is still guarded and requires cuing to relax the arm and to prevent shoulder hiking. She progressed to additional strengthening exercises today with some assistance from therapist.  Communication/Consultation:  None today  Equipment provided today:  None  Recommendations/Intent for next treatment session: Next visit will focus on continued ROM, strengthening.     Total Treatment Billable Duration:  45 minutes  Time In: 9393  Time Out: SHAMEKA Klein       Charge Capture  }Post Session Pain  PT Visit Info  [employer] Portal  MD Guidelines  Scanned Media  Benefits  MyChart    Future Appointments   Date Time Provider Gayle Devlin   2/24/2023  8:45 AM Rita Ibarra PT [facility]   2/27/2023 12:30 PM Rita Ibarra PT [facility]   2/28/2023 10:30 AM Nancy Blancas MD [facility]   3/2/2023 12:30 PM Rita Ibarra PT [facility]   3/6/2023 12:30 PM [facility]dom[facility]   3/9/2023 12:30 [facility]

## 2023-02-23 ENCOUNTER — CLINICAL DOCUMENTATION (OUTPATIENT)
Dept: ORTHOPEDIC SURGERY | Age: 53
End: 2023-02-23

## 2023-02-23 NOTE — PROGRESS NOTES
HAS APPROVED FOR PATIENT TO RECEIVE TREATMENT FOR LEFT HAND.  WANTS TO WAIT UNTIL  SEES ON 2/28/23 TO BE SURE SHE DOES NEED TO SEE SOMEONE FOR HER HAND. IT IS MY UNDERSTANDING SHE HAS NOT COMPLETED HER PT THAT  ORDERED.

## 2023-02-24 ENCOUNTER — HOSPITAL ENCOUNTER (OUTPATIENT)
Dept: PHYSICAL THERAPY | Age: 53
Setting detail: RECURRING SERIES
Discharge: HOME | End: 2023-02-27
Payer: COMMERCIAL

## 2023-02-24 PROCEDURE — 97110 THERAPEUTIC EXERCISES: CPT

## 2023-02-24 ASSESSMENT — PAIN SCALES - GENERAL: PAINLEVEL_OUTOF10: 5

## 2023-02-24 NOTE — PROGRESS NOTES
Calivn Ha  : 1970  Primary: Generic [employer] (Worker's Comp)  Secondary:  Joselyn Hoover @ [facility]  [address] [place] [address]  Phone: [phone]  Fax: [phone] Plan Frequency: 2x per week for 12 weeks    Plan of Care/Certification Expiration Date: 23      PT Visit Info:  Plan Frequency: 2x per week for 12 weeks  Plan of Care/Certification Expiration Date: 23      Visit Count:  6    OUTPATIENT PHYSICAL THERAPY:OP NOTE TYPE: Treatment Note 2023       Episode  }Appt Desk             Treatment Diagnosis:  Pain in Left Shoulder (M25.512)  Stiffness of Left Shoulder, Not elsewhere classified (M25.612)  Medical/Referring Diagnosis:  S/P orthopedic surgery, follow-up exam [Z09]  Left shoulder pain, unspecified chronicity [M25.512]  Referring Physician:  Tamika Ortiz MD MD Orders:  PT Eval and Treat   Date of Onset:  Onset Date: 22     Allergies:   Patient has no known allergies. Restrictions/Precautions:  Restrictions/Precautions: Other (comment) (ROM and strengthening per MD protocol)  No data recorded     Interventions Planned (Treatment may consist of any combination of the following):    Current Treatment Recommendations: Strengthening; ROM; ADL/Self-care training; IADL training; Neuromuscular re-education; Manual; Pain management; Return to work related activity; Home exercise program; Modalities; Therapeutic activities     Subjective Comments:  Pt reports the shoulder is gradually improving  Initial:}    5/10Post Session:       4/10  Medications Last Reviewed:  2023  Updated Objective Findings:  None Today  Treatment   THERAPEUTIC EXERCISE: (45 minutes):    Exercises per grid below to improve mobility and strength. Required minimal verbal cues to promote proper body mechanics. Progressed resistance, range, and repetitions as indicated.   UBE x 5 min  Biceps curls 4#  2 x 10  Pulleys with assistance 2 x 15 for flex, scaption  Shoulder rows yellow x 25  Bent over rows 4# x 20  Punches yellow x 25  Standing wand flexion x 20  Wall washing x 20 for flexion, scaption-held  PROM in supine in all directions  Putter extension x 10 hold 3-5 sec-held  Supine punches x 25-held  Supine flexion 1# with assistance x 20  Shoulder IR yellow TB x 20  Prone extension x 20-held  S/L ER x 20 1#    MANUAL THERAPY: ( minutes):   Joint mobilization and Soft tissue mobilization was utilized and necessary because of the patient's restricted joint motion and restricted motion of soft tissue. Treatment/Session Summary:    Treatment Assessment:  Pt progressing well. Able to relax more and has improved ROM overall and progressed weight/resistance as tolerated  Communication/Consultation:  None today  Equipment provided today:  None  Recommendations/Intent for next treatment session: Next visit will focus on continued ROM, strengthening.     Total Treatment Billable Duration:  45 minutes  Time In: 0845  Time Out: [address] [facility], PT       Charge Capture  }Post Session Pain  PT Visit Info  [employer] Portal  MD Guidelines  Scanned Media  Benefits  MyChart    Future Appointments   Date Time Provider Gayle Devlin   2/27/2023 12:30 PM Windy Dc PT [facility]   2/28/2023 10:30 AM Elvia Barajas MD [facility]   3/2/2023 12:30 PM Windy Dc PT [facility]   3/6/2023 12:30 PM Windy Dc PT [facility]   3/9/2023 12:30 PM Windy Dao PT [facility]

## 2023-02-27 ENCOUNTER — HOSPITAL ENCOUNTER (OUTPATIENT)
Dept: PHYSICAL THERAPY | Age: 53
Setting detail: RECURRING SERIES
Discharge: HOME | End: 2023-03-02
Payer: COMMERCIAL

## 2023-02-27 PROCEDURE — 97110 THERAPEUTIC EXERCISES: CPT

## 2023-02-27 ASSESSMENT — PAIN SCALES - GENERAL: PAINLEVEL_OUTOF10: 4

## 2023-02-27 NOTE — PROGRESS NOTES
Lissa James  : 1970  Primary: Generic [employer] (Worker's Comp)  Secondary:  [address] @ [facility]  [address] [facility] [address]  Phone: [phone]  Fax: [phone] Plan Frequency: 2x per week for 12 weeks    Plan of Care/Certification Expiration Date: 23      PT Visit Info:  Plan Frequency: 2x per week for 12 weeks  Plan of Care/Certification Expiration Date: 23      Visit Count:  7    OUTPATIENT PHYSICAL THERAPY:OP NOTE TYPE: Treatment Note 2023       Episode  }Appt Desk             Treatment Diagnosis:  Pain in Left Shoulder (M25.512)  Stiffness of Left Shoulder, Not elsewhere classified (M25.612)  Medical/Referring Diagnosis:  S/P orthopedic surgery, follow-up exam [Z09]  Left shoulder pain, unspecified chronicity [M25.512]  Referring Physician:  Smiley Dawson MD MD Orders:  PT Eval and Treat   Date of Onset:  Onset Date: 22     Allergies:   Patient has no known allergies. Restrictions/Precautions:  Restrictions/Precautions: Other (comment) (ROM and strengthening per MD protocol)  No data recorded     Interventions Planned (Treatment may consist of any combination of the following):    Current Treatment Recommendations: Strengthening; ROM; ADL/Self-care training; IADL training; Neuromuscular re-education; Manual; Pain management; Return to work related activity; Home exercise program; Modalities; Therapeutic activities     Subjective Comments:  Pt reports the shoulder was sore over the weekend and she rested it. Initial:}    4/10Post Session:       3/10  Medications Last Reviewed:  2023  Updated Objective Findings:  None Today  Treatment   THERAPEUTIC EXERCISE: (45 minutes):    Exercises per grid below to improve mobility and strength. Required minimal verbal cues to promote proper body mechanics. Progressed resistance, range, and repetitions as indicated.   UBE x 6 min  Biceps curls 4#  2 x 10-held  Pulleys with assistance 2 x 15 for flex, scaption  Shoulder rows yellow x 25  Bent over rows 4# x 30  Punches yellow x 25  Standing wand flexion x 20  Wall washing x 20 for flexion, scaption-held  PROM in supine in all directions  Putter extension x 10 hold 3-5 sec-held  Supine punches x 25-1#  Supine flexion 1# with assistance x 20-held  Shoulder IR yellow TB x 20  Prone extension x 20-held  S/L ER x 20 1#  Forward flexion with stick against wall x 20    MANUAL THERAPY: ( minutes):   Joint mobilization and Soft tissue mobilization was utilized and necessary because of the patient's restricted joint motion and restricted motion of soft tissue. Treatment/Session Summary:    Treatment Assessment:  Pt responding well to exercises and ROM. She has limited strength and is unable to reach fully overhead or lift objects. Encouraged pt to continue performong exercises and use ice if painful or sore. Communication/Consultation:  None today  Equipment provided today:  None  Recommendations/Intent for next treatment session: Next visit will focus on continued ROM, strengthening.     Total Treatment Billable Duration:  45 minutes  Time In: 3675  Time Out: SHAMEKA Klein       Charge Capture  }Post Session Pain  PT Visit Info  [employer] Portal  MD Guidelines  Scanned Media  Benefits  MyChart    Future Appointments   Date Time Provider Gayle Devlin   2/28/2023 10:30 AM Eri Ceja MD [facility]   3/2/2023 12:30 PM Trude Gaucher, PT [facility]   3/6/2023 12:30 PM Trude Gaucher, PT [facility]   3/9/2023 12:30 PM Trude Gaucher, PT [facility]

## 2023-02-28 ENCOUNTER — OFFICE VISIT (OUTPATIENT)
Dept: ORTHOPEDIC SURGERY | Age: 53
End: 2023-02-28

## 2023-02-28 DIAGNOSIS — Z09 S/P ORTHOPEDIC SURGERY, FOLLOW-UP EXAM: Primary | ICD-10-CM

## 2023-02-28 DIAGNOSIS — G56.02 LEFT CARPAL TUNNEL SYNDROME: ICD-10-CM

## 2023-02-28 RX ORDER — TRIAMCINOLONE ACETONIDE 40 MG/ML
40 INJECTION, SUSPENSION INTRA-ARTICULAR; INTRAMUSCULAR ONCE
Status: COMPLETED | OUTPATIENT
Start: 2023-02-28 | End: 2023-02-28

## 2023-02-28 RX ADMIN — TRIAMCINOLONE ACETONIDE 40 MG: 40 INJECTION, SUSPENSION INTRA-ARTICULAR; INTRAMUSCULAR at 11:24
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
## 2023-02-28 NOTE — LETTER
February 28, 2023       Lissa James YOB: 1970   [address] [place] [id number] Date of Visit:  2/28/2023       To Whom It May Concern:    Appointment date:  February 28, 2023    Lissa James was in our office for an appointment on the date listed above. Please excuse him/her from work on this day. Please see notes regarding work status:    return to work on Monday, March 20 with restrictions of no lifting more than 2 pounds with the left arm and no overhead activity with the left arm. Follow-up appointment: 6 weeks     If you have any questions or concerns, please don't hesitate to call.     Sincerely,        Emely Gil MD

## 2023-03-01 ENCOUNTER — TELEPHONE (OUTPATIENT)
Dept: ORTHOPEDIC SURGERY | Age: 53
End: 2023-03-01

## 2023-03-02 ENCOUNTER — HOSPITAL ENCOUNTER (OUTPATIENT)
Dept: PHYSICAL THERAPY | Age: 53
Setting detail: RECURRING SERIES
Discharge: HOME | End: 2023-03-05
Payer: COMMERCIAL

## 2023-03-02 PROCEDURE — 97110 THERAPEUTIC EXERCISES: CPT

## 2023-03-02 PROCEDURE — 97140 MANUAL THERAPY 1/> REGIONS: CPT

## 2023-03-02 ASSESSMENT — PAIN SCALES - GENERAL: PAINLEVEL_OUTOF10: 4

## 2023-03-02 NOTE — PROGRESS NOTES
Oanh Sanchez  : 1970  Primary: Generic [employer] (Worker's Comp)  Secondary:  [address] @ [facility]  [address] Jerson Perez [place] [address]  Phone: [phone]  Fax: [phone] Plan Frequency: 2x per week for 12 weeks    Plan of Care/Certification Expiration Date: 23      PT Visit Info:  Plan Frequency: 2x per week for 12 weeks  Plan of Care/Certification Expiration Date: 23      Visit Count:  8    OUTPATIENT PHYSICAL THERAPY:OP NOTE TYPE: Treatment Note 3/2/2023       Episode  }Appt Desk             Treatment Diagnosis:  Pain in Left Shoulder (M25.512)  Stiffness of Left Shoulder, Not elsewhere classified (M25.612)  Medical/Referring Diagnosis:  S/P orthopedic surgery, follow-up exam [Z09]  Left shoulder pain, unspecified chronicity [M25.512]  Referring Physician:  Pauline Meigs, MD MD Orders:  PT Eval and Treat   Date of Onset:  Onset Date: 22     Allergies:   Patient has no known allergies. Restrictions/Precautions:  Restrictions/Precautions: Other (comment) (ROM and strengthening per MD protocol)  No data recorded     Interventions Planned (Treatment may consist of any combination of the following):    Current Treatment Recommendations: Strengthening; ROM; ADL/Self-care training; IADL training; Neuromuscular re-education; Manual; Pain management; Return to work related activity; Home exercise program; Modalities; Therapeutic activities     Subjective Comments:  Pt reports she did see MD yesterday and received shot in left wrist due to hand pain she has been having. Initial:}    4/10Post Session:       3/10  Medications Last Reviewed:  3/2/2023  Updated Objective Findings:  None Today  Treatment   THERAPEUTIC EXERCISE: (30 minutes):    Exercises per grid below to improve mobility and strength. Required minimal verbal cues to promote proper body mechanics. Progressed resistance, range, and repetitions as indicated.   UBE x 6 min  Biceps curls 4#  2 x 10-held  Pulleys with assistance 2 x 15 for flex, scaption, IR-x 15  Shoulder rows green x 30  Bent over rows 4# x 30  Punches red x 30  Standing wand flexion x 20-held  Wall washing x 20 for flexion, scaption-  Supine punches x 25-1#-held  Supine flexion 1# with assistance x 20-held  Shoulder IR yellow TB x 20  Prone extension x 20-held  S/L ER x 20 2#  Forward flexion with stick against wall x 20-held  Standing ER yellow TB x 25  Standing flexion to 90 1# x 20  Standing scaption to 90 1# x 20  MANUAL THERAPY: ( 10 minutes):   Joint mobilization and Soft tissue mobilization was utilized and necessary because of the patient's restricted joint motion and restricted motion of soft tissue. GHJ inferior and posterior mobs Grade III and IV for improved shoulder mobility along with PROM      Treatment/Session Summary:    Treatment Assessment:  Pt progressed well overall. Gaining motion and strength. Did require some cuing to avoid shoulder shrug with elevation. Communication/Consultation:  None today  Equipment provided today:  None  Recommendations/Intent for next treatment session: Next visit will focus on continued ROM, strengthening.     Total Treatment Billable Duration:  40 minutes  Time In: 6377  Time Out: 60 Donal Grimes 151, PT       Charge Capture  }Post Session Pain  PT Visit Info  Remember [employer] Portal  MD Guidelines  Scanned Media  Benefits  MyChart    Future Appointments   Date Time Provider Gayle Devlin   3/6/2023 12:30 PM Marisa Maravilla PT [facility]   3/9/2023 12:30 PM Marisa Maravilla PT [facility]   3/13/2023  9:30 AM Marisa Maravilla PT [facility]   3/15/2023  9:30 AM Marisa Maravilla PT [facility]   4/18/2023  9:20 AM Timo Tyler MD [facility]

## 2023-03-06 ENCOUNTER — HOSPITAL ENCOUNTER (OUTPATIENT)
Dept: PHYSICAL THERAPY | Age: 53
Setting detail: RECURRING SERIES
Discharge: HOME | End: 2023-03-09
Payer: COMMERCIAL

## 2023-03-06 PROCEDURE — 97140 MANUAL THERAPY 1/> REGIONS: CPT

## 2023-03-06 PROCEDURE — 97110 THERAPEUTIC EXERCISES: CPT

## 2023-03-06 ASSESSMENT — PAIN SCALES - GENERAL: PAINLEVEL_OUTOF10: 3

## 2023-03-06 NOTE — PROGRESS NOTES
Jennifer Renee  : 1970  Primary: Generic [employer] (Worker's Comp)  Secondary:  [address] @ [facility]  [address] [place] [address]  Phone: [phone]  Fax: [phone] Plan Frequency: 2x per week for 12 weeks    Plan of Care/Certification Expiration Date: 23      PT Visit Info:  Plan Frequency: 2x per week for 12 weeks  Plan of Care/Certification Expiration Date: 23      Visit Count:  9    OUTPATIENT PHYSICAL THERAPY:OP NOTE TYPE: Treatment Note 3/6/2023       Episode  }Appt Desk             Treatment Diagnosis:  Pain in Left Shoulder (M25.512)  Stiffness of Left Shoulder, Not elsewhere classified (M25.612)  Medical/Referring Diagnosis:  S/P orthopedic surgery, follow-up exam [Z09]  Left shoulder pain, unspecified chronicity [M25.512]  Referring Physician:  Laila Contreras MD MD Orders:  PT Eval and Treat   Date of Onset:  Onset Date: 22     Allergies:   Patient has no known allergies. Restrictions/Precautions:  Restrictions/Precautions: Other (comment) (ROM and strengthening per MD protocol)  No data recorded     Interventions Planned (Treatment may consist of any combination of the following):    Current Treatment Recommendations: Strengthening; ROM; ADL/Self-care training; IADL training; Neuromuscular re-education; Manual; Pain management; Return to work related activity; Home exercise program; Modalities; Therapeutic activities     Subjective Comments:  Pt reports she still has soreness in the shoulder. Intermittent numbness in left fingers number 3 and four  Initial:}    3/10Post Session:       3/10  Medications Last Reviewed:  3/6/2023  Updated Objective Findings:  None Today  Treatment   THERAPEUTIC EXERCISE: (35 minutes):    Exercises per grid below to improve mobility and strength. Required minimal verbal cues to promote proper body mechanics. Progressed resistance, range, and repetitions as indicated.   UBE x 6 min  Biceps curls 5#  2 x 10  Pulleys with assistance 2 x 15 for flex, scaption, IR-x 20  Shoulder rows green x 30  Bent over rows 5# x 30  Punches red x 30  Standing wand flexion x 20-held  Wall washing x 20 for flexion, scaption-  Supine punches x 25-1#-held  Supine flexion 1# with assistance x 20-held  Shoulder IR red TB x 25  Prone extension x 20- 2#  S/L ER x 20 2#  Forward flexion with stick against wall x 20-held  Standing ER yellow TB x 25  Standing flexion to 90 1# x 20  Standing scaption to 90 1# x 20  Triceps press 7# x 20  MANUAL THERAPY: ( 10 minutes):   Joint mobilization and Soft tissue mobilization was utilized and necessary because of the patient's restricted joint motion and restricted motion of soft tissue. GHJ inferior and posterior mobs Grade III and IV for improved shoulder mobility along with PROM      Treatment/Session Summary:    Treatment Assessment:  Pt continues to require some encouragement to use the shoulder. She is slowly progressing with strength and motion. Can reach overhead now. unable to sustain position for any length of time  Communication/Consultation:  None today  Equipment provided today:  None  Recommendations/Intent for next treatment session: Next visit will focus on continued ROM, strengthening.     Total Treatment Billable Duration:  45 minutes  Time In: 0319  Time Out: SHAMEKA Klein       Charge Capture  }Post Session Pain  PT Visit Info  [employer] Portal  MD Guidelines  Scanned Media  Benefits  MyChart    Future Appointments   Date Time Provider Gayle Devlin   3/9/2023 12:30 PM Emelyn Rush PT [facility]   3/13/2023  9:30 AM Emelyn Rush PT [facility]   3/15/2023  9:30 AM Emelyn Rush PT [facility]   4/18/2023  9:20 AM Laquita Cunningham MD [facility]

## 2023-03-13 ENCOUNTER — HOSPITAL ENCOUNTER (OUTPATIENT)
Dept: PHYSICAL THERAPY | Age: 53
Setting detail: RECURRING SERIES
End: 2023-03-13
Payer: COMMERCIAL

## 2023-03-14 DIAGNOSIS — G56.02 LEFT CARPAL TUNNEL SYNDROME: Primary | ICD-10-CM

## 2023-03-15 ENCOUNTER — HOSPITAL ENCOUNTER (OUTPATIENT)
Dept: PHYSICAL THERAPY | Age: 53
Setting detail: RECURRING SERIES
Discharge: HOME | End: 2023-03-18
Payer: COMMERCIAL

## 2023-03-15 PROCEDURE — 97110 THERAPEUTIC EXERCISES: CPT

## 2023-03-15 PROCEDURE — 97140 MANUAL THERAPY 1/> REGIONS: CPT

## 2023-03-15 ASSESSMENT — PAIN SCALES - GENERAL: PAINLEVEL_OUTOF10: 3

## 2023-03-15 NOTE — PROGRESS NOTES
Yoshi Mckeon  : 1970  Primary: Generic [employer] (Worker's Comp)  Secondary:  [address] @ [facility] [address] [place] [facility] [address]  Phone: [phone]  Fax: [phone] Plan Frequency: 2x per week for 12 weeks    Plan of Care/Certification Expiration Date: 23      PT Visit Info:  Plan Frequency: 2x per week for 12 weeks  Plan of Care/Certification Expiration Date: 23      Visit Count:  10                OUTPATIENT PHYSICAL THERAPY:             OP NOTE TYPE: Progress Report 3/15/2023               Episode (RCR) Appt Desk         Treatment Diagnosis:  Pain in Left Shoulder (M25.512)  Stiffness of Left Shoulder, Not elsewhere classified (M25.612)  Medical/Referring Diagnosis:  S/P orthopedic surgery, follow-up exam [Z09]  Left shoulder pain, unspecified chronicity [M25.512]  Referring Physician:  Romel Dowling MD MD Orders:  PT Eval and Treat   Return MD Appt:  unknown at this time  Date of Onset:  Onset Date: 22      Allergies:  Patient has no known allergies. Restrictions/Precautions:    Restrictions/Precautions: Other (comment) (ROM and strengthening per MD protocol)      Medications Last Reviewed:  3/15/2023     SUBJECTIVE   History of Injury/Illness (Reason for Referral):  Pt originally began to feel left shoulder pain after receiving Covid vaccination. She struggled with pain and loss of ROM for roughly a year. In , she was referred to outpatient therapy for frozen shoulder. She was able to have some increased in motion and strength, but still had ongoing pain. She underwent cuff repair on 22 along with labrum debridement, biceps tenotomy. Patient Stated Goal(s):  \"to decrease pain, increase motion and strength\"  Initial:     3/10 Post Session:     2/10  Past Medical History/Comorbidities:   Ms. Tom Aguero  has a past medical history of Tachycardia.   Ms. Tom Aguero  has a past surgical history that includes Shoulder arthroscopy (Left, 12/9/2022). Social History/Living Environment:   Lives With: Spouse     Prior Level of Function/Work/Activity:   Prior level of function: Independent  Occupation: Full time employment         Learning:   Does the patient/guardian have any barriers to learning?: No barriers  Will there be a co-learner?: No  What is the preferred language of the patient/guardian?: English  Is an  required?: No  How does the patient/guardian prefer to learn new concepts?: Listening; Demonstration; Pictures/Videos     Fall Risk Scale: Tobar Total Score: 0  Tobar Fall Risk: Low (0-24)           OBJECTIVE   UE ROM:       R UE   LE UE//updated 3/15/23   Flex 137 °     80 °//115 °supine AAROM 125 °         Abd 160 °   75 °//102 °       ER 75 °   25 °// 42 °       IR 80 °   30 °//50 °             UE strength measures:     R UE L UE//updated 3/15/23   Flex  5 /5   NT/5       3/5       Abduction   5/5   NT/5       2-/5   ER   5/5   NT/5           3/5   IR  5 /5  NT /5         3/5   biceps  5 /5  NT /5       3/5   triceps   5/5  NT /5      3/5       ASSESSMENT   Initial Assessment:  Pt originally began to feel left shoulder pain after receiving Covid vaccination. She struggled with pain and loss of ROM for roughly a year. In June, she was referred to outpatient therapy for frozen shoulder. She was able to have some increase in motion and strength, but still had ongoing pain. She underwent cuff repair on 12/9/22 along with labrum debridement, biceps tenotomy. She has had home health and is ready for outpatient therapy. She is tentative with motion and fearful to move. Assessment as of 3/15/23:  Pt responding well to therapy overall. She reports she has continued difficulty with lifting objects and with using the arm for all her ADL's like reaching into fridge, styling hair, holding grandchild, lifting weighted objects. She is also having left third and fourth finger numbness and has addressed this issue with her doctor. She does require encouragement to use the arm as she often is fearful to do this. Sachin score has improved to 55 from 6. Pt continues to benefit from therapy to address her lack of motion, strength and to allow her to return to her normal, unrestricted ADL's and work tasks. Problem List: (Impacting functional limitations): Body Structures, Functions, Activity Limitations Requiring Skilled Therapeutic Intervention: Decreased ADL status; Decreased ROM; Decreased tolerance to work activity; Decreased strength; Increased pain     Therapy Prognosis:   Therapy Prognosis: Good     Initial Assessment Complexity:   Decision Making: Low Complexity    PLAN   Effective Dates: 1/25/23 TO Plan of Care/Certification Expiration Date: 04/25/23     Frequency/Duration: Plan Frequency: 2x per week for 12 weeks     Interventions Planned (Treatment may consist of any combination of the following):    Current Treatment Recommendations: Strengthening; ROM; ADL/Self-care training; IADL training; Neuromuscular re-education; Manual; Pain management; Return to work related activity; Home exercise program; Modalities; Therapeutic activities     Goals: (Goals have been discussed and agreed upon with patient.)  Short-Term Functional Goals: Time Frame: 6 weeks    Pt will be independent with HEP. -met  Pt will be able to gain 10-20 degrees of active shoulder motion for improved reaching.-met  Pt will be able to use the UE for her ADL's to include brushing teeth, dressing, styling hair, opening doors.-In progress  Discharge Goals: Time Frame: 12 weeks    Pt will be able to gain full/functional ROM for reaching in all directions.-in progress  Pt will be able to report sleeping on shoulder for 1-2 hours per night.-in progress  Pt will be able to have 4/5 to 4+/5 strength needed for lifting grandchildren, household objects. - in progress  Pt will be able to place objects on overhead shelves.-in progress  Pt will increase score on Sachin by at least 20 points. -met         Outcome Measure: Tool Used: SARAH Shoulder Score  Initial Score: 6/100 Most Recent: 55/100 (Date: 3/15/23)   Interpretation of Score: 20 questions each scored on a 3 point scale with 0 representing \"extreme difficulty or unable to perform\" and 3 representing \"no difficulty\", 3 questions each scored from 0-10 about pain, and 1 question scored 0-10 about function of the shoulder  The lower the score, the greater the functional disability. 100/100 represents no disability, pain or loss of function. Minimal clinically important difference is 11.4. Minimal detectable change is 12.1. Medical Necessity:   > Skilled intervention continues to be required due to lack of full shoulder motion and strength needed for reaching and lifting. Reason For Services/Other Comments:  > Patient continues to require skilled intervention due to lack of full shoulder motion and strength needed for reaching and lifting. Total Duration:  Time In: 0930  Time Out: 1015    Regarding Cori Young's therapy, I certify that the treatment plan above will be carried out by a therapist or under their direction. Thank you for this referral,  Cedrick Wilson PT     Referring Physician Signature: Bart Drake MD No Signature is Required for this note.         Post Session Pain  Charge Capture  PT Visit Info MD Guidelines  MyChart

## 2023-03-15 NOTE — PROGRESS NOTES
Benjamine Frankel  : 1970  Primary: Generic [employer] (Worker's Comp)  Secondary:  [address] @ [facility]  [address] [facility] [address]  Phone: [phone]  Fax: [phone] Plan Frequency: 2x per week for 12 weeks    Plan of Care/Certification Expiration Date: 23      PT Visit Info:  Plan Frequency: 2x per week for 12 weeks  Plan of Care/Certification Expiration Date: 23      Visit Count:  10    OUTPATIENT PHYSICAL THERAPY:OP NOTE TYPE: Treatment Note 3/15/2023       Episode  }Appt Desk             Treatment Diagnosis:  Pain in Left Shoulder (M25.512)  Stiffness of Left Shoulder, Not elsewhere classified (M25.612)  Medical/Referring Diagnosis:  S/P orthopedic surgery, follow-up exam [Z09]  Left shoulder pain, unspecified chronicity [M25.512]  Referring Physician:  Esther Ash MD MD Orders:  PT Eval and Treat   Date of Onset:  Onset Date: 22     Allergies:   Patient has no known allergies. Restrictions/Precautions:  Restrictions/Precautions: Other (comment) (ROM and strengthening per MD protocol)  No data recorded     Interventions Planned (Treatment may consist of any combination of the following):    Current Treatment Recommendations: Strengthening; ROM; ADL/Self-care training; IADL training; Neuromuscular re-education; Manual; Pain management; Return to work related activity; Home exercise program; Modalities; Therapeutic activities     Subjective Comments:  Pt reports the shoulder is slowly improving. She still has complaints of left finger numbness, pain  Initial:}    3/10Post Session:       2/10  Medications Last Reviewed:  3/15/2023  Updated Objective Findings:  None Today  Treatment   THERAPEUTIC EXERCISE: (35 minutes):    Exercises per grid below to improve mobility and strength. Required minimal verbal cues to promote proper body mechanics. Progressed resistance, range, and repetitions as indicated.   UBE x 6 min  Biceps curls 5#  2 x 10  Pulleys with assistance 2 x 15 for flex, scaption, IR-x 30  Shoulder rows green x 30  Bent over rows 5# x 30  Punches green x 30  Standing wand flexion x 20  Supine punches x 25-1#-held  Supine flexion 1# with assistance x 20-held  Shoulder IR green  TB x 25  Prone extension x 20- 3#  S/L ER x 20 2#  Forward flexion with stick against wall x 20-  Standing ER red TB x 25  Standing flexion to 90 1# x 20-held  Standing scaption to 90 1# x 20-held  Prone h. Abd with assistance x 15  Triceps press 7# x 20  MANUAL THERAPY: ( 10 minutes):   Joint mobilization and Soft tissue mobilization was utilized and necessary because of the patient's restricted joint motion and restricted motion of soft tissue. GHJ inferior and posterior mobs Grade III and IV for improved shoulder mobility along with PROM      Treatment/Session Summary:    Treatment Assessment:  Pt progressing well overall. Still slow in her overall recovery, but gaining motion and strength. Encouraged pt to use the arm for her ADL's. Communication/Consultation:  None today  Equipment provided today:  None  Recommendations/Intent for next treatment session: Next visit will focus on continued ROM, strengthening.     Total Treatment Billable Duration:  45 minutes  Time In: 0930  Time Out: R Gem Reynolds 115, PT       Charge Capture  }Post Session Pain  PT Visit Info  [facility] Portal  MD Guidelines  Scanned Media  Benefits  MyChart    Future Appointments   Date Time Provider Gayle Devlin   3/20/2023  1:30 PM Kristi Jones, PT [facility]   3/21/2023  1:30 PM Kristi Jones PT [facility]   4/18/2023  9:20 AM Dion Sen MD [facility]

## 2023-03-20 ENCOUNTER — HOSPITAL ENCOUNTER (OUTPATIENT)
Dept: PHYSICAL THERAPY | Age: 53
Setting detail: RECURRING SERIES
Discharge: HOME | End: 2023-03-23
Payer: COMMERCIAL

## 2023-03-20 PROCEDURE — 97110 THERAPEUTIC EXERCISES: CPT

## 2023-03-20 PROCEDURE — 97140 MANUAL THERAPY 1/> REGIONS: CPT

## 2023-03-20 ASSESSMENT — PAIN SCALES - GENERAL: PAINLEVEL_OUTOF10: 3

## 2023-03-20 NOTE — PROGRESS NOTES
scaption, IR-x 30  Shoulder rows green x 30  Bent over rows 5# x 30-held  Punches green x 30  Standing wand flexion /scaption x 20  Supine punches x 25-1#-held  Supine flexion 1# with assistance x 20-held  Shoulder IR green  TB x 25  Prone extension x 20- 3#  S/L ER x 20 2#-held  Forward flexion with stick against wall x 20-  Standing ER red TB x 30  Standing flexion to 90 2# x 20-  Standing scaption to 90 1# x 20-held  Prone h. Abd with assistance x 20  Triceps press 7# x 20-held  MANUAL THERAPY: ( 10 minutes):   Joint mobilization and Soft tissue mobilization was utilized and necessary because of the patient's restricted joint motion and restricted motion of soft tissue. GHJ inferior and posterior mobs Grade III and IV for improved shoulder mobility along with PROM      Treatment/Session Summary:    Treatment Assessment:  Pt tired today through treatment, but able to complete all exercises. Seems hesitant to reach fully overhead. Need to continue to work on ROM and strengthening  Communication/Consultation:  None today  Equipment provided today:  None  Recommendations/Intent for next treatment session: Next visit will focus on continued ROM, strengthening.     Total Treatment Billable Duration:  45 minutes  Time In: 1330  Time Out: [address], PT       Charge Capture  }Post Session Pain  PT Visit Info  [email or web address] Portal  MD Guidelines  Scanned Media  Benefits  MyChart    Future Appointments   Date Time Provider Gayle Devlin   3/21/2023  1:30 PM Horace Pool, PT [facility]   4/18/2023  9:20 AM David Talavera MD [facility]

## 2023-03-21 ENCOUNTER — HOSPITAL ENCOUNTER (OUTPATIENT)
Dept: PHYSICAL THERAPY | Age: 53
Setting detail: RECURRING SERIES
Discharge: HOME | End: 2023-03-24
Payer: COMMERCIAL

## 2023-03-21 PROCEDURE — 97140 MANUAL THERAPY 1/> REGIONS: CPT

## 2023-03-21 PROCEDURE — 97110 THERAPEUTIC EXERCISES: CPT

## 2023-03-21 ASSESSMENT — PAIN SCALES - GENERAL: PAINLEVEL_OUTOF10: 3

## 2023-03-21 NOTE — PROGRESS NOTES
Mary Ana  : 1970  Primary: Generic [employer] (Worker's Comp)  Secondary:  [address] [facility] [address]  Phone: [phone]  Fax: [phone] Plan Frequency: 2x per week for 12 weeks    Plan of Care/Certification Expiration Date: 23      PT Visit Info:  Plan Frequency: 2x per week for 12 weeks  Plan of Care/Certification Expiration Date: 23      Visit Count:  12    OUTPATIENT PHYSICAL THERAPY:OP NOTE TYPE: Treatment Note 3/21/2023       Episode  }Appt Desk             Treatment Diagnosis:  Pain in Left Shoulder (M25.512)  Stiffness of Left Shoulder, Not elsewhere classified (M25.612)  Medical/Referring Diagnosis:  S/P orthopedic surgery, follow-up exam [Z09]  Left shoulder pain, unspecified chronicity [M25.512]  Referring Physician:  Luis Martinez MD MD Orders:  PT Eval and Treat   Date of Onset:  Onset Date: 22     Allergies:   Patient has no known allergies. Restrictions/Precautions:  Restrictions/Precautions: Other (comment) (ROM and strengthening per MD protocol)  No data recorded     Interventions Planned (Treatment may consist of any combination of the following):    Current Treatment Recommendations: Strengthening; ROM; ADL/Self-care training; IADL training; Neuromuscular re-education; Manual; Pain management; Return to work related activity; Home exercise program; Modalities; Therapeutic activities     Subjective Comments:  Pt reports feeling less tired today. Arm is okay. Still reports some 3 and 4 finger numbness/tingling  Initial:}    3/10Post Session:       3/10  Medications Last Reviewed:  3/21/2023  Updated Objective Findings:  None Today  Treatment   THERAPEUTIC EXERCISE: (30 minutes):    Exercises per grid below to improve mobility and strength. Required minimal verbal cues to promote proper body mechanics. Progressed resistance, range, and repetitions as indicated.   UBE x 6 min  Biceps curls 5#  2 x

## 2023-03-29 ENCOUNTER — HOSPITAL ENCOUNTER (OUTPATIENT)
Dept: PHYSICAL THERAPY | Age: 53
Setting detail: RECURRING SERIES
Discharge: HOME | End: 2023-04-01
Payer: COMMERCIAL

## 2023-03-29 PROCEDURE — 97140 MANUAL THERAPY 1/> REGIONS: CPT

## 2023-03-29 PROCEDURE — 97110 THERAPEUTIC EXERCISES: CPT

## 2023-03-29 ASSESSMENT — PAIN SCALES - GENERAL: PAINLEVEL_OUTOF10: 3

## 2023-03-29 NOTE — PROGRESS NOTES
assistance 2 x 15 for flex, scaption, IR-x 30  Shoulder rows green x 30  Bent over rows 5# x 30  Punches green x 30  Supine punches x 25-2#  Supine flexion 2# with assistance x 20  Shoulder IR green  TB x 30  Prone extension x 20- 3#  S/L ER x 20 2#-held  Forward flexion with stick against wall x 20-held  Standing ER red TB x 30  Standing flexion to 90 2# x 20-  Standing scaption to 90 1# x 20-held  Prone h. Abd with assistance x 20  Triceps press 7# x 20-held  Overhead pull downs 7# x 20-held  SA wall slides pink band x 20  MANUAL THERAPY: ( 15 minutes):   Joint mobilization and Soft tissue mobilization was utilized and necessary because of the patient's restricted joint motion and restricted motion of soft tissue. GHJ inferior and posterior mobs Grade III and IV for improved shoulder mobility along with PROM      Treatment/Session Summary:    Treatment Assessment:  Pt continues to lack full motion and strength. she is progressing and able to perform increased resistance and reps on several exercises today  Communication/Consultation:  None today  Equipment provided today:  None  Recommendations/Intent for next treatment session: Next visit will focus on continued ROM, strengthening.     Total Treatment Billable Duration:  45 minutes  Time In: 0930  Time Out: R Gem Gail 115, PT       Charge Capture  }Post Session Pain  PT Visit [facility] Portal  MD Guidelines  Scanned Media  Benefits  MyChart    Future Appointments   Date Time Provider Gayle Devlin   3/31/2023 10:15 AM MD KENDRICK Marvin [facility] AM[facility]   4/4/2023 12:30 PM Ignacia Milks, PT [facility]   4/10/2023  8:45 AM Ignacia Milks, PT [facility]   4/14/2023  8:45 AM Ignacia Milks, PT [facility]   4/17/2023  8:45 AM Ignacia Milks, PT [facility]   4/18/2023  9:20 AM Jessica Reyes MD [facility] AMB   4/21/2023  8:45 AM Ignacia Milks, PT [facility]   4/25/2023  8:45 AM Ignacia Milks, PT [facility]   4/28/2023  8:45 AM Ignacia Milks, PT

## 2023-03-31 ENCOUNTER — OFFICE VISIT (OUTPATIENT)
Dept: ORTHOPEDIC SURGERY | Age: 53
End: 2023-03-31

## 2023-03-31 ENCOUNTER — TELEPHONE (OUTPATIENT)
Dept: ORTHOPEDIC SURGERY | Age: 53
End: 2023-03-31

## 2023-03-31 DIAGNOSIS — G62.9 POLYNEUROPATHY: ICD-10-CM

## 2023-03-31 DIAGNOSIS — G56.22 CUBITAL TUNNEL SYNDROME ON LEFT: ICD-10-CM

## 2023-03-31 DIAGNOSIS — G56.02 LEFT CARPAL TUNNEL SYNDROME: Primary | ICD-10-CM

## 2023-03-31 NOTE — TELEPHONE ENCOUNTER
Deshawn Needs this pt is a [employer] employee but is also wokers comp and Dr. Aspen Hardin wants her to see Marybeth Houston fr her EMG. I placed a external referral due to work comp, could you fax to pt .

## 2023-03-31 NOTE — PROGRESS NOTES
COVID-vaccine or post COVID is rather unpredictable, I have seen many patients that have developed carpal tunnel like symptoms after any of these conditions however, more often than not it presents as polyneuropathy that improves over time. The nerve conduction study will help us delineate her nerve condition to better guide her treatment. I will reassess as soon as the nerve conduction study is not. Patient voiced accordance and understanding of the information provided and the formulated plan. All questions were answered to the patient's satisfaction during the encounter.     Franklin Sanchez MD  Orthopaedic Surgery  03/31/23  10:59 AM

## 2023-04-04 ENCOUNTER — HOSPITAL ENCOUNTER (OUTPATIENT)
Dept: PHYSICAL THERAPY | Age: 53
Setting detail: RECURRING SERIES
End: 2023-04-04
Payer: COMMERCIAL

## 2023-04-10 ENCOUNTER — HOSPITAL ENCOUNTER (OUTPATIENT)
Dept: PHYSICAL THERAPY | Age: 53
Setting detail: RECURRING SERIES
End: 2023-04-10
Payer: COMMERCIAL

## 2023-04-14 ENCOUNTER — HOSPITAL ENCOUNTER (OUTPATIENT)
Dept: PHYSICAL THERAPY | Age: 53
Setting detail: RECURRING SERIES
Discharge: HOME | End: 2023-04-17
Payer: COMMERCIAL

## 2023-04-14 PROCEDURE — 97110 THERAPEUTIC EXERCISES: CPT

## 2023-04-14 PROCEDURE — 97140 MANUAL THERAPY 1/> REGIONS: CPT

## 2023-04-14 ASSESSMENT — PAIN SCALES - GENERAL: PAINLEVEL_OUTOF10: 3

## 2023-04-17 ENCOUNTER — TELEPHONE (OUTPATIENT)
Dept: ORTHOPEDIC SURGERY | Age: 53
End: 2023-04-17

## 2023-04-17 ENCOUNTER — HOSPITAL ENCOUNTER (OUTPATIENT)
Dept: PHYSICAL THERAPY | Age: 53
Setting detail: RECURRING SERIES
Discharge: HOME | End: 2023-04-20
Payer: COMMERCIAL

## 2023-04-17 DIAGNOSIS — G56.02 LEFT CARPAL TUNNEL SYNDROME: ICD-10-CM

## 2023-04-17 DIAGNOSIS — G56.22 CUBITAL TUNNEL SYNDROME ON LEFT: Primary | ICD-10-CM

## 2023-04-17 DIAGNOSIS — G62.9 POLYNEUROPATHY: ICD-10-CM

## 2023-04-17 PROCEDURE — 97110 THERAPEUTIC EXERCISES: CPT

## 2023-04-17 PROCEDURE — 97140 MANUAL THERAPY 1/> REGIONS: CPT

## 2023-04-17 ASSESSMENT — PAIN SCALES - GENERAL: PAINLEVEL_OUTOF10: 3

## 2023-04-17 NOTE — TELEPHONE ENCOUNTER
----- Message from Ayleen Perez. Tanvir Sheffield sent at 4/17/2023  6:39 AM EDT -----  Regarding: Nerve Conduction   Contact: [phone]  Good morning! !I havent heard anything about scheduling nerve conduction test.I am still having issue and wonder what I can take in the mean time. Thanks Kerri Brush

## 2023-04-17 NOTE — PROGRESS NOTES
[facility]   5/19/2023  8:45 AM Kristyn Foster, PT [facility]   5/23/2023  8:45 AM Rodolfo Carrillo, PTA [facility]   5/26/2023  8:45 AM Kristyn Foster, PT [facility]

## 2023-04-21 ENCOUNTER — APPOINTMENT (OUTPATIENT)
Dept: PHYSICAL THERAPY | Age: 53
End: 2023-04-21
Payer: COMMERCIAL

## 2023-04-25 ENCOUNTER — APPOINTMENT (OUTPATIENT)
Dept: PHYSICAL THERAPY | Age: 53
End: 2023-04-25
Payer: COMMERCIAL

## 2023-04-27 ENCOUNTER — HOSPITAL ENCOUNTER (OUTPATIENT)
Dept: PHYSICAL THERAPY | Age: 53
Setting detail: RECURRING SERIES
Discharge: HOME | End: 2023-04-30
Payer: COMMERCIAL

## 2023-04-27 PROCEDURE — 97110 THERAPEUTIC EXERCISES: CPT

## 2023-04-27 ASSESSMENT — PAIN SCALES - GENERAL: PAINLEVEL_OUTOF10: 3

## 2023-04-28 ENCOUNTER — HOSPITAL ENCOUNTER (OUTPATIENT)
Dept: PHYSICAL THERAPY | Age: 53
Setting detail: RECURRING SERIES
Discharge: HOME | End: 2023-05-01
Payer: COMMERCIAL

## 2023-04-28 PROCEDURE — 97110 THERAPEUTIC EXERCISES: CPT

## 2023-04-28 PROCEDURE — 97140 MANUAL THERAPY 1/> REGIONS: CPT

## 2023-04-28 ASSESSMENT — PAIN SCALES - GENERAL: PAINLEVEL_OUTOF10: 3

## 2023-05-02 ENCOUNTER — HOSPITAL ENCOUNTER (OUTPATIENT)
Dept: PHYSICAL THERAPY | Age: 53
Setting detail: RECURRING SERIES
Discharge: HOME | End: 2023-05-05
Payer: COMMERCIAL

## 2023-05-02 PROCEDURE — 97110 THERAPEUTIC EXERCISES: CPT

## 2023-05-02 ASSESSMENT — PAIN SCALES - GENERAL: PAINLEVEL_OUTOF10: 3

## 2023-05-02 NOTE — PROGRESS NOTES
Juanjo Muniz  : 1970  Primary: Generic [employer] (Worker's Comp)  Secondary:  [address] [facility] [address] [address]  Phone: [phone]  Fax: [phone] Plan Frequency: 2x per week for 12 weeks    Plan of Care/Certification Expiration Date: 23      PT Visit Info:  Plan Frequency: 2x per week for 12 weeks  Plan of Care/Certification Expiration Date: 23      Visit Count:  18    OUTPATIENT PHYSICAL THERAPY:OP NOTE TYPE: Treatment Note 2023       Episode  }Appt Desk             Treatment Diagnosis:  Pain in Left Shoulder (M25.512)  Stiffness of Left Shoulder, Not elsewhere classified (M25.612)  Medical/Referring Diagnosis:  S/P orthopedic surgery, follow-up exam [Z09]  Left shoulder pain, unspecified chronicity [M25.512]  Referring Physician:  Lorena Walter MD MD Orders:  PT Eval and Treat   Date of Onset:  Onset Date: 22     Allergies:   Patient has no known allergies. Restrictions/Precautions:  Restrictions/Precautions: Other (comment) (ROM and strengthening per MD protocol)  No data recorded     Interventions Planned (Treatment may consist of any combination of the following):    Current Treatment Recommendations: Strengthening; ROM; ADL/Self-care training; IADL training; Neuromuscular re-education; Manual; Pain management; Return to work related activity; Home exercise program; Modalities; Therapeutic activities     Subjective Comments:  Pt reports feeling more hand pain, burning. She reports this can be worse than the shoulder at times. Initial:}    3/10Post Session:       2/10  Medications Last Reviewed:  2023  Updated Objective Findings:  None Today  Treatment   THERAPEUTIC EXERCISE: (55 minutes):    Exercises per grid below to improve mobility and strength. Required minimal verbal cues to promote proper body mechanics. Progressed resistance, range, and repetitions as indicated.   UBE x 6 min level 3  Biceps curls 5#

## 2023-05-05 ENCOUNTER — HOSPITAL ENCOUNTER (OUTPATIENT)
Dept: PHYSICAL THERAPY | Age: 53
Setting detail: RECURRING SERIES
Discharge: HOME | End: 2023-05-08
Payer: COMMERCIAL

## 2023-05-05 PROCEDURE — 97110 THERAPEUTIC EXERCISES: CPT

## 2023-05-05 PROCEDURE — 97140 MANUAL THERAPY 1/> REGIONS: CPT

## 2023-05-05 ASSESSMENT — PAIN SCALES - GENERAL: PAINLEVEL_OUTOF10: 3

## 2023-05-09 ENCOUNTER — HOSPITAL ENCOUNTER (OUTPATIENT)
Dept: PHYSICAL THERAPY | Age: 53
Setting detail: RECURRING SERIES
Discharge: HOME | End: 2023-05-12
Payer: COMMERCIAL

## 2023-05-09 PROCEDURE — 97140 MANUAL THERAPY 1/> REGIONS: CPT

## 2023-05-09 PROCEDURE — 97110 THERAPEUTIC EXERCISES: CPT

## 2023-05-09 ASSESSMENT — PAIN SCALES - GENERAL: PAINLEVEL_OUTOF10: 3

## 2023-05-09 NOTE — PROGRESS NOTES
Nicolas Cook  : 1970  Primary: Generic [employer] (Worker's Comp)  Secondary:  [address] @ [facility] [address]  Phone: [phone]  Fax: [phone] Plan Frequency: 2x per week for 12 weeks    Plan of Care/Certification Expiration Date: 23      PT Visit Info:  Plan Frequency: 2x per week for 12 weeks  Plan of Care/Certification Expiration Date: 23      Visit Count:  20    OUTPATIENT PHYSICAL THERAPY:OP NOTE TYPE: Treatment Note 2023       Episode  }Appt Desk             Treatment Diagnosis:  Pain in Left Shoulder (M25.512)  Stiffness of Left Shoulder, Not elsewhere classified (M25.612)  Medical/Referring Diagnosis:  S/P orthopedic surgery, follow-up exam [Z09]  Left shoulder pain, unspecified chronicity [M25.512]  Referring Physician:  Мария Cardona MD MD Orders:  PT Eval and Treat   Date of Onset:  Onset Date: 22     Allergies:   Patient has no known allergies. Restrictions/Precautions:  Restrictions/Precautions: Other (comment) (ROM and strengthening per MD protocol)  No data recorded     Interventions Planned (Treatment may consist of any combination of the following):    Current Treatment Recommendations: Strengthening; ROM; ADL/Self-care training; IADL training; Neuromuscular re-education; Manual; Pain management; Return to work related activity; Home exercise program; Modalities; Therapeutic activities     Subjective Comments:  Pt reports shoulder is okay. She still is having issues with hand, numbness at times in the fingers on the left hand  Initial:}    3/10Post Session:       2/10  Medications Last Reviewed:  2023  Updated Objective Findings:  None Today  Treatment   THERAPEUTIC EXERCISE: (35 minutes):    Exercises per grid below to improve mobility and strength. Required minimal verbal cues to promote proper body mechanics. Progressed resistance, range, and repetitions as indicated.   UBE x 6 min level

## 2023-05-11 ENCOUNTER — OFFICE VISIT (OUTPATIENT)
Dept: ORTHOPEDIC SURGERY | Age: 53
End: 2023-05-11

## 2023-05-11 VITALS — WEIGHT: 185 LBS | HEIGHT: 65 IN | BODY MASS INDEX: 30.82 KG/M2

## 2023-05-11 DIAGNOSIS — Z09 S/P ORTHOPEDIC SURGERY, FOLLOW-UP EXAM: ICD-10-CM

## 2023-05-11 DIAGNOSIS — M75.52 SUBACROMIAL BURSITIS OF LEFT SHOULDER JOINT: Primary | ICD-10-CM

## 2023-05-11 NOTE — PROGRESS NOTES
Name: Hayley Edmondson  YOB: 1970  Gender: female  MRN: [id number]      CC: Follow-up  Procedure Performed:   1) left  Shoulder Arthroscopic Rotator Cuff Repair  2)  left shoulder arthroscopy with distal clavicle resection  3) left shoulder arthroscopy with debridement including biceps tenotomy, labrum tear, glenohumeral capsule   4) left  Shoulder Arthroscopy with subacromial decompression        Date of Procedure: 12/9/2022    HPI: Hayley Edmondson is a 47 y.o. female who returns for follow up on  left shoulder. She stated she has missed three physical therapy appointments due to work. She continues to have pain and swelling in shoulder. She reports that she continues to have difficulty forming physical therapy due to numbness and tingling that originates from the elbow and goes down to the hand. She received a corticosteroid injection in the carpal tunnel but that provided no relief. She notes that she was evaluated by Dr. Ishan Goldstein and was referred for nerve conduction studies but has had difficulty getting these scheduled with Workmen's Comp. Physical Examination:  General: no acute distress  Lungs: breathing easily  CV: regular rhythm by pulse  Left Shoulder: Mild area of erythema in the anterior shoulder not concerning for any infection. Active forward flexion to 150 degrees limited by pain. Rotation with elbows at side to 45 degrees which is decreased compared to the contralateral side. Active internal rotation to the hip, external rotation to the ear. External rotation with elbows at side resisted range of motion 5/5 strength. Pain with empty can testing but 5/5 strength. Positive Angelito Raudel Weiner's. Assessment:     ICD-10-CM    1. Subacromial bursitis of left shoulder joint  M75.52       2. S/P orthopedic surgery, follow-up exam  Z09           Plan: Think the majority of her range of motion limitations is due to pain with subacromial impingement.   Her strength feels

## 2023-05-12 ENCOUNTER — APPOINTMENT (OUTPATIENT)
Dept: PHYSICAL THERAPY | Age: 53
End: 2023-05-12
Payer: COMMERCIAL

## 2023-05-16 ENCOUNTER — HOSPITAL ENCOUNTER (OUTPATIENT)
Dept: PHYSICAL THERAPY | Age: 53
Setting detail: RECURRING SERIES
Discharge: HOME | End: 2023-05-19
Payer: COMMERCIAL

## 2023-05-16 PROCEDURE — 97110 THERAPEUTIC EXERCISES: CPT

## 2023-05-16 PROCEDURE — 97140 MANUAL THERAPY 1/> REGIONS: CPT

## 2023-05-16 PROCEDURE — 97016 VASOPNEUMATIC DEVICE THERAPY: CPT

## 2023-05-16 ASSESSMENT — PAIN SCALES - GENERAL: PAINLEVEL_OUTOF10: 3

## 2023-05-16 NOTE — PROGRESS NOTES
Olga Serra  : 1970  Primary: Generic [employer] (Worker's Comp)  Secondary:  [address] @ [facility]  [address] [place] [address]  Phone: [phone]  Fax: [phone] Plan Frequency: 2x per week for 12 weeks    Plan of Care/Certification Expiration Date: 23      PT Visit Info:  Plan Frequency: 2x per week for 12 weeks  Plan of Care/Certification Expiration Date: 23      Visit Count:  21    OUTPATIENT PHYSICAL THERAPY:OP NOTE TYPE: Treatment Note 2023       Episode  }Appt Desk             Treatment Diagnosis:  Pain in Left Shoulder (M25.512)  Stiffness of Left Shoulder, Not elsewhere classified (M25.612)  Medical/Referring Diagnosis:  S/P orthopedic surgery, follow-up exam [Z09]  Left shoulder pain, unspecified chronicity [M25.512]  Referring Physician:  Sindy Heard MD MD Orders:  PT Eval and Treat   Date of Onset:  Onset Date: 22     Allergies:   Patient has no known allergies. Restrictions/Precautions:  Restrictions/Precautions: Other (comment) (ROM and strengthening per MD protocol)  No data recorded     Interventions Planned (Treatment may consist of any combination of the following):    Current Treatment Recommendations: Strengthening; ROM; ADL/Self-care training; IADL training; Neuromuscular re-education; Manual; Pain management; Return to work related activity; Home exercise program; Modalities; Therapeutic activities     Subjective Comments:  Pt did see Dr. Dariela Sy last week. Received injection in shoulder. Still working on appointments for her wrist/hand nerve testing. Initial:}    3/10Post Session:       2/10  Medications Last Reviewed:  2023  Updated Objective Findings:  None Today  Treatment   THERAPEUTIC EXERCISE: (40 minutes):    Exercises per grid below to improve mobility and strength. Required minimal verbal cues to promote proper body mechanics. Progressed resistance, range, and repetitions as indicated.   UBE x 6

## 2023-05-23 ENCOUNTER — APPOINTMENT (OUTPATIENT)
Dept: PHYSICAL THERAPY | Age: 53
End: 2023-05-23
Payer: COMMERCIAL

## 2023-05-23 ENCOUNTER — PROCEDURE VISIT (OUTPATIENT)
Dept: PHYSICAL MEDICINE AND REHAB | Age: 53
End: 2023-05-23

## 2023-05-23 VITALS
HEART RATE: 85 BPM | SYSTOLIC BLOOD PRESSURE: 118 MMHG | WEIGHT: 185 LBS | DIASTOLIC BLOOD PRESSURE: 72 MMHG | HEIGHT: 65 IN | BODY MASS INDEX: 30.82 KG/M2

## 2023-05-23 DIAGNOSIS — R20.2 NUMBNESS AND TINGLING IN LEFT ARM: Primary | ICD-10-CM

## 2023-05-23 DIAGNOSIS — R20.0 NUMBNESS AND TINGLING IN LEFT ARM: Primary | ICD-10-CM

## 2023-05-24 ENCOUNTER — HOSPITAL ENCOUNTER (OUTPATIENT)
Dept: PHYSICAL THERAPY | Age: 53
Setting detail: RECURRING SERIES
Discharge: HOME | End: 2023-05-27
Payer: COMMERCIAL

## 2023-05-24 DIAGNOSIS — M75.52 SUBACROMIAL BURSITIS OF LEFT SHOULDER JOINT: Primary | ICD-10-CM

## 2023-05-24 PROCEDURE — 97110 THERAPEUTIC EXERCISES: CPT

## 2023-05-24 PROCEDURE — 97140 MANUAL THERAPY 1/> REGIONS: CPT

## 2023-05-24 PROCEDURE — 97016 VASOPNEUMATIC DEVICE THERAPY: CPT

## 2023-05-24 ASSESSMENT — PAIN SCALES - GENERAL: PAINLEVEL_OUTOF10: 3

## 2023-05-24 NOTE — PROGRESS NOTES
Reddy Monte  : 1970  Primary: Generic [employer] (Worker's Comp)  Secondary:  [address] @ [facility]  [facility] Lisandra [place] [address]  Phone: [phone]  Fax: [phone] Plan Frequency: 2x per week for 12 weeks    Plan of Care/Certification Expiration Date: 23      PT Visit Info:  Plan Frequency: 2x per week for 12 weeks  Plan of Care/Certification Expiration Date: 23      Visit Count:  22    OUTPATIENT PHYSICAL THERAPY:OP NOTE TYPE: Treatment Note 2023       Episode  }Appt Desk             Treatment Diagnosis:  Pain in Left Shoulder (M25.512)  Stiffness of Left Shoulder, Not elsewhere classified (M25.612)  Medical/Referring Diagnosis:  S/P orthopedic surgery, follow-up exam [Z09]  Left shoulder pain, unspecified chronicity [M25.512]  Referring Physician:  Francia Mar MD MD Orders:  PT Eval and Treat   Date of Onset:  Onset Date: 22     Allergies:   Patient has no known allergies. Restrictions/Precautions:  Restrictions/Precautions: Other (comment) (ROM and strengthening per MD protocol)  No data recorded     Interventions Planned (Treatment may consist of any combination of the following):    Current Treatment Recommendations: Strengthening; ROM; ADL/Self-care training; IADL training; Neuromuscular re-education; Manual; Pain management; Return to work related activity; Home exercise program; Modalities; Therapeutic activities     Subjective Comments:  Pt reports feeling okay today. She did have nerve conduction test yesterday  Initial:}    3/10Post Session:       2/10  Medications Last Reviewed:  2023  Updated Objective Findings:  None Today  Treatment   THERAPEUTIC EXERCISE: (40 minutes):    Exercises per grid below to improve mobility and strength. Required minimal verbal cues to promote proper body mechanics. Progressed resistance, range, and repetitions as indicated.   UBE x 6 1/2 min level 3  Biceps curls 5#  2 x

## 2023-05-26 ENCOUNTER — HOSPITAL ENCOUNTER (OUTPATIENT)
Dept: PHYSICAL THERAPY | Age: 53
Setting detail: RECURRING SERIES
Discharge: HOME | End: 2023-05-29
Payer: COMMERCIAL

## 2023-05-26 PROCEDURE — 97110 THERAPEUTIC EXERCISES: CPT

## 2023-05-26 ASSESSMENT — PAIN SCALES - GENERAL: PAINLEVEL_OUTOF10: 3

## 2023-06-06 ENCOUNTER — HOSPITAL ENCOUNTER (OUTPATIENT)
Dept: PHYSICAL THERAPY | Age: 53
Setting detail: RECURRING SERIES
Discharge: HOME | End: 2023-06-09
Payer: COMMERCIAL

## 2023-06-06 PROCEDURE — 97110 THERAPEUTIC EXERCISES: CPT

## 2023-06-06 ASSESSMENT — PAIN SCALES - GENERAL: PAINLEVEL_OUTOF10: 3

## 2023-06-06 NOTE — PROGRESS NOTES
Yoshi Mckeon  : 1970  Primary: Generic [employer] (Worker's Comp)  Secondary:  [address] @ [facility]  [address] [place] [address] [place] [address]  Phone: [phone]  Fax: [phone] Plan Frequency: 2x per week for 12 weeks    Plan of Care/Certification Expiration Date: 23      PT Visit Info:  Plan Frequency: 2x per week for 12 weeks  Plan of Care/Certification Expiration Date: 23      Visit Count:  24                OUTPATIENT PHYSICAL THERAPY:             OP NOTE TYPE: Progress Report   2023               Episode (RCR) Appt Desk         Treatment Diagnosis:  Pain in Left Shoulder (M25.512)  Stiffness of Left Shoulder, Not elsewhere classified (M25.612)  Medical/Referring Diagnosis:  S/P orthopedic surgery, follow-up exam [Z09]  Left shoulder pain, unspecified chronicity [M25.512]  Referring Physician:  Romel Dowling MD MD Orders:  PT Eval and Treat   Return MD Appt:  unknown at this time  Date of Onset:  Onset Date: 22      Allergies:  Patient has no known allergies. Restrictions/Precautions:    Restrictions/Precautions: Other (comment) (ROM and strengthening per MD protocol)      Medications Last Reviewed:  2023     SUBJECTIVE   History of Injury/Illness (Reason for Referral):  Pt originally began to feel left shoulder pain after receiving Covid vaccination. She struggled with pain and loss of ROM for roughly a year. In , she was referred to outpatient therapy for frozen shoulder. She was able to have some increased in motion and strength, but still had ongoing pain. She underwent cuff repair on 22 along with labrum debridement, biceps tenotomy. Patient Stated Goal(s):  \"to decrease pain, increase motion and strength\"  Initial:      /10 Post Session:      /10  Past Medical History/Comorbidities:   Ms. Tom Aguero  has a past medical history of Tachycardia.   Ms. Tom Aguero  has a past surgical history that includes Shoulder arthroscopy (Left,

## 2023-06-06 NOTE — PROGRESS NOTES
Staci Amaro  : 1970  Primary: Generic [employer] (Worker's Comp)  Secondary:  [address] @ [facility] [address]  Phone: [phone]  Fax: [phone] Plan Frequency: 2x per week for 12 weeks    Plan of Care/Certification Expiration Date: 23      PT Visit Info:  Plan Frequency: 2x per week for 12 weeks  Plan of Care/Certification Expiration Date: 23      Visit Count:  24    OUTPATIENT PHYSICAL THERAPY:OP NOTE TYPE: Treatment Note 2023       Episode  }Appt Desk             Treatment Diagnosis:  Pain in Left Shoulder (M25.512)  Stiffness of Left Shoulder, Not elsewhere classified (M25.612)  Medical/Referring Diagnosis:  S/P orthopedic surgery, follow-up exam [Z09]  Left shoulder pain, unspecified chronicity [M25.512]  Referring Physician:  Luma Diaz MD MD Orders:  PT Eval and Treat   Date of Onset:  Onset Date: 22     Allergies:   Patient has no known allergies. Restrictions/Precautions:  Restrictions/Precautions: Other (comment) (ROM and strengthening per MD protocol)  No data recorded     Interventions Planned (Treatment may consist of any combination of the following):    Current Treatment Recommendations: Strengthening; ROM; ADL/Self-care training; IADL training; Neuromuscular re-education; Manual; Pain management; Return to work related activity; Home exercise program; Modalities; Therapeutic activities     Subjective Comments:  Pt reports there is an heaviness in her shoulder. She also requested to leave early due to work conflict. Initial:}    3/10Post Session:       2/10  Medications Last Reviewed:  2023  Updated Objective Findings:  None Today  Treatment   THERAPEUTIC EXERCISE: (35 minutes):    Exercises per grid below to improve mobility and strength. Required minimal verbal cues to promote proper body mechanics. Progressed resistance, range, and repetitions as indicated.   UBE x 5 min level 3  Biceps curls

## 2023-06-07 ENCOUNTER — HOSPITAL ENCOUNTER (OUTPATIENT)
Dept: PHYSICAL THERAPY | Age: 53
Setting detail: RECURRING SERIES
End: 2023-06-07
Payer: COMMERCIAL

## 2023-06-20 ENCOUNTER — APPOINTMENT (OUTPATIENT)
Dept: PHYSICAL THERAPY | Age: 53
End: 2023-06-20
Payer: COMMERCIAL

## 2023-06-21 ENCOUNTER — HOSPITAL ENCOUNTER (OUTPATIENT)
Dept: PHYSICAL THERAPY | Age: 53
Setting detail: RECURRING SERIES
Discharge: HOME | End: 2023-06-24
Payer: COMMERCIAL

## 2023-06-21 PROCEDURE — 97016 VASOPNEUMATIC DEVICE THERAPY: CPT

## 2023-06-21 PROCEDURE — 97110 THERAPEUTIC EXERCISES: CPT

## 2023-06-21 PROCEDURE — 97140 MANUAL THERAPY 1/> REGIONS: CPT

## 2023-06-21 ASSESSMENT — PAIN SCALES - GENERAL: PAINLEVEL_OUTOF10: 3

## 2023-06-21 NOTE — PROGRESS NOTES
3  Pulleys with assistance 2 x 15 for  IR-x 30-  Shoulder rows 10# x 30-  Bent over rows 7# x 30-held  Punches 7# 3 x 10-  Supine punches x 25-2#-held  Supine flexion 2# overhead-x 20  Shoulder IR 7# 2 x 15-  Prone extension x 30- 4#-held  S/L ER x 20 2#-held  Standing flexion to 90 3# 2 x 10  Standing ER 5# 3 x 10 with cables  Corner stretching 4x hold 15 sec  Standing scaption to 90 2# x 20  Prone h. Abd with assistance x 20-held  Triceps press 10# x 20-held  Overhead pull downs 10# x 20-  SA wall slides pink band x 20-  Standing shoulder arc yellow TB x 15-  Shoulder flexion stretch against wall x 10-held  Overhead flexion 1# 2 x 10 with cuing -held  Prone rows 4# x 20-held  Resisted clock exercise on wall green TB 2 x 10  MANUAL THERAPY: ( 8 minutes):   Joint mobilization and Soft tissue mobilization was utilized and necessary because of the patient's restricted joint motion and restricted motion of soft tissue. GHJ inferior and posterior mobs Grade III and IV for improved shoulder mobility along with PROM  Elbow STM at lateral epicondyle    Modalities:  (10 min) gameready to left shoulder for improved pain control after exercises. Moderate compression at 32 ° F    Treatment/Session Summary:    Treatment Assessment:  Pt progressed well today. She still has some complaints of pressure on the shoulder. She can reach in all directions, but lacks end range motion. She measured 167 degrees of flexion in supine. Communication/Consultation:  None today  Equipment provided today:  None  Recommendations/Intent for next treatment session: Next visit will focus on continued ROM, strengthening.     Total Treatment Billable Duration:  70  minutes  Time In: 4900  Time Out: 4200 Chayo Lee PT       Charge Capture  }Post Session Pain  PT Visit Info  [employer] Portal  MD Guidelines  Scanned Media  Benefits  MyChart    Future Appointments   Date Time Provider Gayle Devlin   6/22/2023  8:00 AM Eduardo Dias

## 2023-06-22 ENCOUNTER — OFFICE VISIT (OUTPATIENT)
Dept: ORTHOPEDIC SURGERY | Age: 53
End: 2023-06-22

## 2023-06-22 DIAGNOSIS — Z09 S/P ORTHOPEDIC SURGERY, FOLLOW-UP EXAM: ICD-10-CM

## 2023-06-22 DIAGNOSIS — G62.9 POLYNEUROPATHY: ICD-10-CM

## 2023-06-22 DIAGNOSIS — M25.512 LEFT SHOULDER PAIN, UNSPECIFIED CHRONICITY: Primary | ICD-10-CM

## 2023-06-22 NOTE — PROGRESS NOTES
Name: Raz Medina  YOB: 1970  Gender: female  MRN: [id number]    Procedure Performed:   1) left  Shoulder Arthroscopic Rotator Cuff Repair  2)  left shoulder arthroscopy with distal clavicle resection  3) left shoulder arthroscopy with debridement including biceps tenotomy, labrum tear, glenohumeral capsule   4) left  Shoulder Arthroscopy with subacromial decompression        Date of Procedure: 12/9/2022      Subjective:Returns almost 7 months status post the above procedure. She reports she is still having radiating pain down her arm, pain in her elbow, and burning pain in her hand. She also states she has the sensation of weight on her shoulder pressing down. She has also noted a red, splotchy, rash that will come and go intermittently, as well as swelling. She did get some good improvement from the subacromial injection and does feel she is making progress with physical therapy although this is slow. Physical Examination: She has some splotchy red changes over the anterior aspect the shoulder. Some tenderness to palpation over the distal trapezius and around the [email or web address] joint. She has mild tenderness over the biceps region anteriorly. She is able to actively elevate up to about 160 passively 170. She has appropriate resisted strength in the empty can and external rotation positions which is improving. Very mild pain with crossarm adduction. Motor and sensory function are grossly intact although she has some dull sensory changes and some hypersensitivity around the shoulder to light touch. Assessment:   1. Left shoulder pain, unspecified chronicity    2. Polyneuropathy    3. S/P orthopedic surgery, follow-up exam         Plan:   She is somewhat better after the subacromial injection. I do not have an explanation for her skin changes or the hypersensitivity around her shoulder. She may be having some neurological issues.   I encouraged her to continue building strength and improving

## 2023-06-23 ENCOUNTER — TELEPHONE (OUTPATIENT)
Dept: ORTHOPEDIC SURGERY | Age: 53
End: 2023-06-23

## 2023-06-23 ENCOUNTER — HOSPITAL ENCOUNTER (OUTPATIENT)
Dept: PHYSICAL THERAPY | Age: 53
Setting detail: RECURRING SERIES
Discharge: HOME | End: 2023-06-26
Payer: COMMERCIAL

## 2023-06-23 PROCEDURE — 97110 THERAPEUTIC EXERCISES: CPT

## 2023-06-23 PROCEDURE — 97140 MANUAL THERAPY 1/> REGIONS: CPT

## 2023-06-23 ASSESSMENT — PAIN SCALES - GENERAL: PAINLEVEL_OUTOF10: 2

## 2023-06-27 ENCOUNTER — HOSPITAL ENCOUNTER (OUTPATIENT)
Dept: PHYSICAL THERAPY | Age: 53
Setting detail: RECURRING SERIES
End: 2023-06-27
Payer: COMMERCIAL

## 2023-06-30 ENCOUNTER — HOSPITAL ENCOUNTER (OUTPATIENT)
Dept: PHYSICAL THERAPY | Age: 53
Setting detail: RECURRING SERIES
End: 2023-06-30
Payer: COMMERCIAL

## 2023-06-30 PROCEDURE — 97110 THERAPEUTIC EXERCISES: CPT

## 2023-06-30 PROCEDURE — 97140 MANUAL THERAPY 1/> REGIONS: CPT

## 2023-06-30 ASSESSMENT — PAIN SCALES - GENERAL: PAINLEVEL_OUTOF10: 2

## 2023-07-03 ENCOUNTER — HOSPITAL ENCOUNTER (OUTPATIENT)
Dept: PHYSICAL THERAPY | Age: 53
Setting detail: RECURRING SERIES
End: 2023-07-03
Payer: COMMERCIAL

## 2023-07-03 NOTE — PROGRESS NOTES
Midge Night  : 1970  Primary: [employer] *  Secondary:  [address] @ [facility]  [address] [facility] [address] [address] [address] [place] [facility] [address]  Phone: [phone]  Fax: [phone] Plan Frequency: 2x per week for 12 weeks    Plan of Care/Certification Expiration Date: 23      PT Visit Info:  No data recorded       OUTPATIENT PHYSICAL THERAPY 7/3/2023     Appt Desk   Episode   MyChart      Pt called to cancel appointment today due to work conflicts.   Clovis Hall, DPT    Future Appointments   Date Time Provider [address]   2023  9:30 AM Onnie Blower, PT [facility]   7/10/2023  8:45 AM Onnie Blower, PT [facility] O   2023  8:45 AM Onnie Blower, PT [facility]   2023  8:45 AM Onnie Blower, PT [facility]   2023  8:45 AM Onnie Blower, PT [facility]   2023  8:45 AM Onnie Blower, PT [facility]   2023 10:00 AM MD KENDRICK GuzmánL AMB   2023  8:45 AM Onnie Blower, PT [facility]   2023  8:45 AM Onnie Blower, PT [facility]   8/3/2023  8:50 AM MD JARETH Sigala [facility] AMB

## 2023-07-07 ENCOUNTER — HOSPITAL ENCOUNTER (OUTPATIENT)
Dept: PHYSICAL THERAPY | Age: 53
Setting detail: RECURRING SERIES
Discharge: HOME | End: 2023-07-10
Payer: COMMERCIAL

## 2023-07-07 PROCEDURE — 97140 MANUAL THERAPY 1/> REGIONS: CPT

## 2023-07-07 PROCEDURE — 97110 THERAPEUTIC EXERCISES: CPT

## 2023-07-07 ASSESSMENT — PAIN SCALES - GENERAL: PAINLEVEL_OUTOF10: 2

## 2023-07-10 ENCOUNTER — HOSPITAL ENCOUNTER (OUTPATIENT)
Dept: PHYSICAL THERAPY | Age: 53
Setting detail: RECURRING SERIES
Discharge: HOME | End: 2023-07-13
Payer: COMMERCIAL

## 2023-07-10 PROCEDURE — 97110 THERAPEUTIC EXERCISES: CPT

## 2023-07-10 ASSESSMENT — PAIN SCALES - GENERAL: PAINLEVEL_OUTOF10: 2

## 2023-07-10 NOTE — PROGRESS NOTES
[facility]   7/26/2023 10:00 AM Tiera June MD PO [facility] AMB   7/27/2023  8:45 AM Mayda Score, PT [facility]   7/31/2023  8:45 AM Mayda Score, PT [facility]   8/3/2023  8:50 AM Velvet Ramsey MD [facility] G[facility] AMB

## 2023-07-13 ENCOUNTER — HOSPITAL ENCOUNTER (OUTPATIENT)
Dept: PHYSICAL THERAPY | Age: 53
Setting detail: RECURRING SERIES
Discharge: HOME | End: 2023-07-16
Payer: COMMERCIAL

## 2023-07-13 PROCEDURE — 97110 THERAPEUTIC EXERCISES: CPT

## 2023-07-17 ENCOUNTER — HOSPITAL ENCOUNTER (OUTPATIENT)
Dept: PHYSICAL THERAPY | Age: 53
Setting detail: RECURRING SERIES
Discharge: HOME | End: 2023-07-20
Payer: COMMERCIAL

## 2023-07-17 PROCEDURE — 97110 THERAPEUTIC EXERCISES: CPT

## 2023-07-17 ASSESSMENT — PAIN SCALES - GENERAL: PAINLEVEL_OUTOF10: 1

## 2023-07-17 NOTE — PROGRESS NOTES
30-held  Bent over rows 9# x 30-  Punches 7# 3 x 10-  Supine punches x 25-2#-held  Supine flexion 2# overhead-x 20  Shoulder IR 7# 2 x 15-  Prone extension x 30- 4#-  S/L ER x 20 2#-held  Standing flexion to 90 3# 2 x 10  Standing ER 5# 3 x 10 with cables  Corner stretching 4x hold 15 sec-held  Standing scaption to 90 2# x 20  Prone h. Abd with assistance x 20-1#  Triceps press 10# x 20-held  Overhead pull downs 10# x 20-  SA wall slides pink band x 20-  Standing shoulder arc yellow TB x 20-held  Shoulder flexion stretch against wall x 10-held  Overhead flexion 1# 2 x 10 with cuing -held  Prone rows 5# x 20-held  Resisted clock exercise on wall green TB 2 x 10  Bilateral ER elbows in green TB 2 x 10  PROM  Rows 10# x 20  Overhead ball press 3-4#   Overhead punches 3# x 20  MANUAL THERAPY: ( 0 minutes):   Joint mobilization and Soft tissue mobilization was utilized and necessary because of the patient's restricted joint motion and restricted motion of soft tissue. NOT TODAY      Modalities:  (0 min) Pt declined game ready -has ice machine at home  Treatment/Session Summary:    Treatment Assessment:  Pt progressed well today. Very little discomfort noted with exercises. She is able to lift light weights overhead now  Communication/Consultation:  None today  Equipment provided today:  None  Recommendations/Intent for next treatment session: Next visit will focus on continued ROM, strengthening.     Total Treatment Billable Duration:  45  minutes  Time In: 0845  Time Out: 510 [facility], PT       Charge Capture  }Post Session Pain  PT Visit Info  Paxer Portal  MD Guidelines  Scanned Media  Benefits  MyChart    Future Appointments   Date Time Provider [address]   7/20/2023  8:45 AM Deepa Duckworth, PT [facility]   7/24/2023  8:45 AM Deepa Duckworth, PT [facility]   7/26/2023 10:00 AM Lady Charly MD [facility] AMB   7/27/2023  8:45 AM Deepa Duckworth, PT R[facility]   7/31/2023  8:45 AM Cathi Villarreal

## 2023-07-20 ENCOUNTER — HOSPITAL ENCOUNTER (OUTPATIENT)
Dept: PHYSICAL THERAPY | Age: 53
Setting detail: RECURRING SERIES
End: 2023-07-20
Payer: COMMERCIAL

## 2023-07-20 NOTE — PROGRESS NOTES
Harlan Hickman  : 1970  Primary:   Secondary:  [address] @ [facility]  [address] [facility] [place] [address]  Phone: [phone]  Fax: [phone] Plan Frequency: 2x per week for 12 weeks    Plan of Care/Certification Expiration Date: 23      PT Visit Info:  No data recorded       OUTPATIENT PHYSICAL THERAPY 2023     Appt Desk   Episode   MyChart      Pt called to cancel appointment today due to illness.   Mary Suresh, DPT    Future Appointments   Date Time Provider [address]   2023  8:45 AM Deepa Duckworth, PT [facility]   2023  8:45 AM Deepa Duckworth, PT [facility]   2023 10:00 AM MD KENDRICK Acevedo [facility] AMB   2023  8:45 AM Deepa Duckworth, PT [facility]   2023  8:45 AM Deepa Duckworth, PT [facility]   8/3/2023  8:50 AM Colton Knapp MD [facility] AMB

## 2023-07-24 ENCOUNTER — HOSPITAL ENCOUNTER (OUTPATIENT)
Dept: PHYSICAL THERAPY | Age: 53
Setting detail: RECURRING SERIES
Discharge: HOME | End: 2023-07-27
Payer: COMMERCIAL

## 2023-07-24 PROCEDURE — 97110 THERAPEUTIC EXERCISES: CPT

## 2023-07-24 ASSESSMENT — PAIN SCALES - GENERAL: PAINLEVEL_OUTOF10: 1

## 2023-07-24 NOTE — PROGRESS NOTES
Gwen Wei  : 1970  Primary: [employer] [facility] * (Worker's Comp)  Secondary:  [address] [facility]  [address] [facility] [address]  Phone: [phone]  Fax: [phone] Plan Frequency: 2x per week for 12 weeks    Plan of Care/Certification Expiration Date: 23      PT Visit Info:  Plan Frequency: 2x per week for 12 weeks  Plan of Care/Certification Expiration Date: 23      Visit Count:  35                OUTPATIENT PHYSICAL THERAPY:             OP NOTE TYPE: Progress Report  and Recertification                Episode (RCR) Appt Desk         Treatment Diagnosis:  Pain in Left Shoulder (M25.512)  Stiffness of Left Shoulder, Not elsewhere classified (M25.612)  Medical/Referring Diagnosis:  S/P orthopedic surgery, follow-up exam [Z09]  Left shoulder pain, unspecified chronicity [M25.512]  Referring Physician:  Topher Scott MD MD Orders:  PT Eval and Treat   Return MD Appt:  unknown at this time  Date of Onset:  Onset Date: 22      Allergies:  Patient has no known allergies. Restrictions/Precautions:    Restrictions/Precautions: Other (comment) (ROM and strengthening per MD protocol)      Medications Last Reviewed:  2023     SUBJECTIVE   History of Injury/Illness (Reason for Referral):  Pt originally began to feel left shoulder pain after receiving Covid vaccination. She struggled with pain and loss of ROM for roughly a year. In , she was referred to outpatient therapy for frozen shoulder. She was able to have some increased in motion and strength, but still had ongoing pain. She underwent cuff repair on 22 along with labrum debridement, biceps tenotomy. Patient Stated Goal(s):  \"to decrease pain, increase motion and strength\"  Initial:     1/10 Post Session:     1/10  Past Medical History/Comorbidities:   Ms. Ronaldo Munoz  has a past medical history of Tachycardia.   Ms. Ronaldo Munoz  has a past surgical history that
Jessica Alejandros  : 1970  Primary:   Secondary:  [address] @ [facility]  [address] [facility] [address] [place] [address]  Phone: [phone]  Fax: [phone] Plan Frequency: 2x per week for 12 weeks    Plan of Care/Certification Expiration Date: 23      PT Visit Info:  No data recorded       OUTPATIENT PHYSICAL THERAPY 2023     Appt Desk   Episode   MyChart      Pt called to cancel appointment today due to illness.   Agatha Jules, DPT    Future Appointments   Date Time Provider [address]   2023 10:00 AM Xin Link MD [facility] AMB   2023  8:45 AM Nellie Cervantes, PT [facility]   2023  8:45 AM Nellie Cervantes, PT [facility]   8/3/2023  8:50 AM Magdi Nelson MD [facility] AMB
[facility] AMB

## 2023-07-26 ENCOUNTER — OFFICE VISIT (OUTPATIENT)
Dept: ORTHOPEDIC SURGERY | Age: 53
End: 2023-07-26
Payer: COMMERCIAL

## 2023-07-26 DIAGNOSIS — G62.9 POLYNEUROPATHY: Primary | ICD-10-CM

## 2023-07-26 PROCEDURE — 99214 OFFICE O/P EST MOD 30 MIN: CPT | Performed by: ORTHOPAEDIC SURGERY

## 2023-07-26 NOTE — PROGRESS NOTES
[facility] Note    Name: Dany Lau  Age: 47 y.o. YOB: 1970  Gender: female  MRN: [id number]      Follow up visit:   1. Polyneuropathy        HPI: Dany Lau is a 47 y.o. female who is following up for left arm numbness and paresthesias, on the last visit I prescribed steroids and provided braces for nighttime use, patient reports she is about 20% better, she reports that the radiating pain has disappeared, she continues to complain of numbness and paresthesias in the index, middle and ring fingers which are intermittent, they happen at night sometimes. ROS/Meds/PSH/PMH/FH/SH: I personally reviewed the patients standard intake form. Pertinents are discussed in the HPI    Physical Examination:  General: Awake and alert. HEENT: Normocephalic, atraumatic  CV/Pulm: Breathing even and unlabored  Skin: No obvious rashes noted. Lymphatic: No obvious evidence of lymphedema or lymphadenopathy    Musculoskeletal Examination:  Examination on the left upper extremity demonstrates cap refill < 5 seconds in all fingers, she has a positive elbow flexion compression test and a positive Tinel at the cubital tunnel but she has negative provocative maneuvers for carpal tunnel syndrome today. Imaging / Electrodiagnostic Tests:     none    Assessment:   1. Polyneuropathy        Plan:   We discussed the diagnosis and different treatment options. We discussed observation, therapy, antiinflammatory medications and other pertinent treatment modalities.     After discussing in detail the patient elects to proceed with observation for now, she will continue bracing at nighttime, she will return in 2 months for reassessment, we discussed again that given that her symptoms started after COVID it is possibly related to [place] however, she does have provocative maneuvers for cubital tunnel syndrome, we discussed cubital tunnel release surgery however, at this point her symptoms are slightly improved

## 2023-07-27 ENCOUNTER — HOSPITAL ENCOUNTER (OUTPATIENT)
Dept: PHYSICAL THERAPY | Age: 53
Setting detail: RECURRING SERIES
Discharge: HOME | End: 2023-07-30
Payer: COMMERCIAL

## 2023-07-27 PROCEDURE — 97110 THERAPEUTIC EXERCISES: CPT

## 2023-07-27 ASSESSMENT — PAIN SCALES - GENERAL: PAINLEVEL_OUTOF10: 1

## 2023-07-27 NOTE — PROGRESS NOTES
Shana Lagos  : 1970  Primary: [employer] * (Worker's Comp)  Secondary:  [address] @ [facility]  [address] [facility] [place] [address]  Phone: [phone]  Fax: [phone] Plan Frequency: 2x per week for 12 weeks    Plan of Care/Certification Expiration Date: 23      PT Visit Info:  Plan Frequency: 2x per week for 12 weeks  Plan of Care/Certification Expiration Date: 23      Visit Count:  34    OUTPATIENT PHYSICAL THERAPY:OP NOTE TYPE: Treatment Note 2023       Episode  }Appt Desk             Treatment Diagnosis:  Pain in Left Shoulder (M25.512)  Stiffness of Left Shoulder, Not elsewhere classified (M25.612)  Medical/Referring Diagnosis:  S/P orthopedic surgery, follow-up exam [Z09]  Left shoulder pain, unspecified chronicity [M25.512]  Referring Physician:  Matt Kim MD MD Orders:  PT Eval and Treat   Date of Onset:  Onset Date: 22     Allergies:   Patient has no known allergies. Restrictions/Precautions:  Restrictions/Precautions: Other (comment) (ROM and strengthening per MD protocol)  No data recorded     Interventions Planned (Treatment may consist of any combination of the following):    Current Treatment Recommendations: Strengthening; ROM; ADL/Self-care training; IADL training; Neuromuscular re-education; Manual; Pain management; Return to work related activity; Home exercise program; Modalities; Therapeutic activities     Subjective Comments:  Pt reports feeling okay today. Still has pressure on the anterior aspect of the shoulder  Initial:}    1/10Post Session:       1/10  Medications Last Reviewed:  2023  Updated Objective Findings:  None Today  Treatment   THERAPEUTIC EXERCISE: (45 minutes):    Exercises per grid below to improve mobility and strength. Required minimal verbal cues to promote proper body mechanics. Progressed resistance, range, and repetitions as indicated.   UBE x 5 min level 3  Shoulder

## 2023-07-31 ENCOUNTER — HOSPITAL ENCOUNTER (OUTPATIENT)
Dept: PHYSICAL THERAPY | Age: 53
Setting detail: RECURRING SERIES
Discharge: HOME | End: 2023-08-03
Payer: COMMERCIAL

## 2023-07-31 PROCEDURE — 97110 THERAPEUTIC EXERCISES: CPT

## 2023-07-31 ASSESSMENT — PAIN SCALES - GENERAL: PAINLEVEL_OUTOF10: 1

## 2023-07-31 NOTE — PROGRESS NOTES
Paxton Miles  : 1970  Primary: [employer] [facility] Services * (Worker's Comp)  Secondary:  [address] @ [facility]  [address] [facility] [place] [address]  Phone: [phone]  Fax: [phone] Plan Frequency: 2x per week for 12 weeks    Plan of Care/Certification Expiration Date: 23      PT Visit Info:  Plan Frequency: 2x per week for 12 weeks  Plan of Care/Certification Expiration Date: 23      Visit Count:  35    OUTPATIENT PHYSICAL THERAPY:OP NOTE TYPE: Treatment Note 2023       Episode  }Appt Desk             Treatment Diagnosis:  Pain in Left Shoulder (M25.512)  Stiffness of Left Shoulder, Not elsewhere classified (M25.612)  Medical/Referring Diagnosis:  S/P orthopedic surgery, follow-up exam [Z09]  Left shoulder pain, unspecified chronicity [M25.512]  Referring Physician:  Domingo Fay MD MD Orders:  PT Eval and Treat   Date of Onset:  Onset Date: 22     Allergies:   Patient has no known allergies. Restrictions/Precautions:  Restrictions/Precautions: Other (comment) (ROM and strengthening per MD protocol)  No data recorded     Interventions Planned (Treatment may consist of any combination of the following):    Current Treatment Recommendations: Strengthening; ROM; ADL/Self-care training; IADL training; Neuromuscular re-education; Manual; Pain management; Return to work related activity; Home exercise program; Modalities; Therapeutic activities     Subjective Comments:  Pt reports the shoulder still has pressure on it. She also had to leave early due to limited work staff  Initial:}    1/10Post Session:       1/10  Medications Last Reviewed:  2023  Updated Objective Findings:  None Today  Treatment   THERAPEUTIC EXERCISE: (30 minutes):    Exercises per grid below to improve mobility and strength. Required minimal verbal cues to promote proper body mechanics. Progressed resistance, range, and repetitions as indicated.   UBE x 5 min level

## 2023-08-08 ENCOUNTER — OFFICE VISIT (OUTPATIENT)
Dept: ORTHOPEDIC SURGERY | Age: 53
End: 2023-08-08

## 2023-08-08 DIAGNOSIS — Z09 S/P ORTHOPEDIC SURGERY, FOLLOW-UP EXAM: ICD-10-CM

## 2023-08-08 DIAGNOSIS — M25.512 LEFT SHOULDER PAIN, UNSPECIFIED CHRONICITY: Primary | ICD-10-CM

## 2023-08-08 NOTE — PROGRESS NOTES
Name: Macarena Arndt  YOB: 1970  Gender: female  MRN: [id number]      CC: Shoulder Pain (L)  Procedure Performed:   1) left  Shoulder Arthroscopic Rotator Cuff Repair  2)  left shoulder arthroscopy with distal clavicle resection  3) left shoulder arthroscopy with debridement including biceps tenotomy, labrum tear, glenohumeral capsule   4) left  Shoulder Arthroscopy with subacromial decompression        Date of Procedure: 12/9/2022    HPI: Macarena Arndt is a 47 y.o. female who returns for follow up on her L shoulder. She reports she still feels like there is a \"child siting on her shoulder\". She still complains of the shooting pain down from her elbow into her hand. She is being treated by Dr. Fareed Ken for this has been diagnosed with cubital tunnel syndrome and is currently using an elbow splint at night and wearing a wrist splint during the day and they did discuss cubital tunnel release. She reports she continues to get intermittent red splotchiness otherwise not present today. She reports she had sudden onset swelling and pain around her [facility] joint with no mechanism of injury. She feels as though she is progressing with physical therapy although it is slow. She reports she has been using the topical cream which has been helping some with her shoulder pain. She also reports that Workmen's Comp. has not approved any more formal physical therapy visits at this time. Physical Examination:  General: no acute distress  Lungs: breathing easily  CV: regular rhythm by pulse  Left Shoulder: No obvious area of blotchy redness today. Tenderness to palpation over the [facility] joint and anterior shoulder. Active forward flexion to 160 degrees with pain in the extreme. External rotation with elbows at side equal bilaterally. External rotation with elbows at side resisted range of motion 5/5 strength. Pain and buckling with empty can testing. Positive Raudel Carrillo's.         Assessment:

## 2023-08-18 ENCOUNTER — TELEPHONE (OUTPATIENT)
Dept: ORTHOPEDIC SURGERY | Age: 53
End: 2023-08-18

## 2023-08-18 NOTE — TELEPHONE ENCOUNTER
Spoke with Kashif Bowie to let her know her MRI has been approved and go ahead and call radiology to get scheduled.  She will call us back for an MRI f/u

## 2023-09-07 ENCOUNTER — HOSPITAL ENCOUNTER (OUTPATIENT)
Dept: MRI IMAGING | Age: 53
Discharge: HOME | End: 2023-09-10
Attending: ORTHOPAEDIC SURGERY

## 2023-09-07 DIAGNOSIS — M25.512 LEFT SHOULDER PAIN, UNSPECIFIED CHRONICITY: ICD-10-CM

## 2023-09-07 DIAGNOSIS — Z09 S/P ORTHOPEDIC SURGERY, FOLLOW-UP EXAM: ICD-10-CM

## 2023-09-08 NOTE — PROGRESS NOTES
Name: Navid Nava  YOB: 1970  Gender: female  MRN: [id number]      CC: Shoulder Pain (L)       HPI: Navid Nava is a 47 y.o. female who returns for follow up and MRI results on  her left shoulder. She continues to have pain in the anterior aspect of her shoulder she does think she is got some benefit from the topical compounding cream.        Physical Examination:  General: no acute distress  Lungs: breathing easily  CV: regular rhythm by pulse  Left Shoulder: Tenderness to palpation over the anterior interval rotator interval portion of the shoulder and the biceps tendon. She has full range of motion she has good resisted strength today although some pain associated with rotator cuff testing. Motor and sensory function intact she does have some pain with [place]'s and speeds. Imaging:   I reviewed the MRI scan which shows rotator cuff to be intact in the previous repair. There is some inflammation and mild amount of fluid around the residual biceps tendon in the groove. All imaging interpreted by myself Heladio Guy MD independent of radiology review    Assessment:     ICD-10-CM    1. Biceps tendinitis of left shoulder  M75.22       2. Chronic left shoulder pain  M25.512 Ambulatory referral to Physical Therapy    G89.29            Plan:   Her repair is intact I do not think she has had any new injury but I think she is getting some anterior pain potentially around the biceps tenotomy site and glenohumeral capsule. We discussed continued physical therapy to build strength. Using a topical compounding cream anteriorly. As well as the possibility of an injection into that painful area around the biceps sheath. She elected to proceed with this today. We updated her work restrictions to include no lifting more than 5 pounds with the left arm and limited overhead activity with the left arm.   She will continue physical therapy which I think is very important and then I will see her

## 2023-09-12 ENCOUNTER — OFFICE VISIT (OUTPATIENT)
Dept: ORTHOPEDIC SURGERY | Age: 53
End: 2023-09-12
Payer: COMMERCIAL

## 2023-09-12 DIAGNOSIS — M25.512 CHRONIC LEFT SHOULDER PAIN: ICD-10-CM

## 2023-09-12 DIAGNOSIS — G89.29 CHRONIC LEFT SHOULDER PAIN: ICD-10-CM

## 2023-09-12 DIAGNOSIS — M75.22 BICEPS TENDINITIS OF LEFT SHOULDER: Primary | ICD-10-CM

## 2023-09-12 PROCEDURE — 99214 OFFICE O/P EST MOD 30 MIN: CPT | Performed by: ORTHOPAEDIC SURGERY

## 2023-10-26 ENCOUNTER — OFFICE VISIT (OUTPATIENT)
Dept: ORTHOPEDIC SURGERY | Age: 53
End: 2023-10-26

## 2023-10-26 DIAGNOSIS — G89.29 CHRONIC LEFT SHOULDER PAIN: Primary | ICD-10-CM

## 2023-10-26 DIAGNOSIS — M25.512 CHRONIC LEFT SHOULDER PAIN: Primary | ICD-10-CM

## 2023-10-26 RX ORDER — PREDNISONE 5 MG/1
TABLET ORAL
Qty: 1 EACH | Refills: 2 | Status: SHIPPED | OUTPATIENT
Start: 2023-10-26

## 2023-10-26 NOTE — PROGRESS NOTES
Name: Briseyda Burns  YOB: 1970  Gender: female  MRN: [id number]      CC: Shoulder Pain (L)       HPI: Briseyda Burns is a 47 y.o. female who returns for follow up on her L shoulder pain. She reports she had transient benefit following the biceps tendon sheath injection in September. She notes this injection helping her biceps pain. She states that this pain has returned but she also has pain along the back of her neck and down her arm. She reports she still feels like there is a downward pressure applied on her shoulder, as well as the redness she was experiencing. She has not been to PT, as she is not able to get in touch with her . She states her goal is to hold her grandson without pain. She does not note weakness but notes some pain. Physical Examination:  General: no acute distress  Lungs: breathing easily  CV: regular rhythm by pulse  Left Shoulder: Previous surgical scars noted and well-healed. No gross deformity noted. No bruising, rashes, wounds or sores noted. tenderness noted along the bicipital groove. No tenderness at [facility] joint. No pain with forward flexion today. No pain with abduction external rotation or internal rotation. She is sore along the posterior part of her arm in her deltoid and trapezius muscles. She is also tender along the trapezius muscle. [facility] Agreste Positive [place] and negative Neer signs. Negative speeds. Negative [place]'s test.  Negative [place]'s test.  Sensation intact along radial, ulnar and median nerve.  strength 5 out of 5 when compared to opposite side. Distal radius pulse 2+. Assessment:     ICD-10-CM    1. Chronic left shoulder pain  M25.512 predniSONE 5 MG (48) TBPK    G89.29           Plan:   Discussed with Ms. Babs Tripp today that I do think her pain is muscular. I do think she has some biceps pain as well.   We discussed multiple treatments including physical therapy, NSAID therapy, compounding cream, injection and steroid

## 2023-10-27 ENCOUNTER — TELEPHONE (OUTPATIENT)
Dept: ORTHOPEDIC SURGERY | Age: 53
End: 2023-10-27

## 2023-10-27 NOTE — TELEPHONE ENCOUNTER
Left three voicemails for the  regarding pts PT. At this point, there is a lack of communication on the adjusters part, and I am not sure how to get in touch with anyone regarding this.

## 2023-10-27 NOTE — TELEPHONE ENCOUNTER
Was able to get in touch with Manuel at [facility], he reported her PT script was approved and sent over to Magy Acosta at [address] [facility].

## 2023-11-28 ENCOUNTER — CLINICAL DOCUMENTATION (OUTPATIENT)
Dept: PHYSICAL THERAPY | Age: 53
End: 2023-11-28

## 2023-11-28 NOTE — THERAPY DISCHARGE
[facility] @ [facility]  [address] [facility] [place] [address]  Phone: [phone]  Fax: [phone]    OUTPATIENT PHYSICAL THERAPY  Discontinuation Summary 11/28/2023  Episode  Appt Desk         Fina Young has been seen in physical therapy for 35  visits from 1/25/23 to 7/31/23, with 5 cancellations and 3 no shows. Ms. Young's therapy has come to an end at this time due to: Patient did not return for/schedule additional treatment    Physical Therapy Goals:  Partially meeting goals    Chetna Lee, PT

## 2023-12-06 ENCOUNTER — HOSPITAL ENCOUNTER (OUTPATIENT)
Dept: PHYSICAL THERAPY | Age: 53
Setting detail: RECURRING SERIES
End: 2023-12-06
Payer: COMMERCIAL

## 2023-12-13 ENCOUNTER — HOSPITAL ENCOUNTER (OUTPATIENT)
Dept: PHYSICAL THERAPY | Age: 53
Setting detail: RECURRING SERIES
Discharge: HOME | End: 2023-12-16
Payer: COMMERCIAL

## 2023-12-13 DIAGNOSIS — G89.29 CHRONIC LEFT SHOULDER PAIN: Primary | ICD-10-CM

## 2023-12-13 DIAGNOSIS — M25.512 CHRONIC LEFT SHOULDER PAIN: Primary | ICD-10-CM

## 2023-12-13 DIAGNOSIS — M25.612 STIFFNESS OF LEFT SHOULDER, NOT ELSEWHERE CLASSIFIED: ICD-10-CM

## 2023-12-13 DIAGNOSIS — M75.112 INCOMPLETE ROTATOR CUFF TEAR OR RUPTURE OF LEFT SHOULDER, NOT SPECIFIED AS TRAUMATIC: ICD-10-CM

## 2023-12-13 PROCEDURE — 97161 PT EVAL LOW COMPLEX 20 MIN: CPT

## 2023-12-13 PROCEDURE — 97140 MANUAL THERAPY 1/> REGIONS: CPT

## 2023-12-13 PROCEDURE — 97110 THERAPEUTIC EXERCISES: CPT

## 2023-12-13 ASSESSMENT — PAIN SCALES - GENERAL: PAINLEVEL_OUTOF10: 5

## 2023-12-13 NOTE — PLAN OF CARE
Loree Rodriguez  : 1970  Primary: [employer] [facility] Services * (Worker's Comp)  Secondary:  [address] [facility]  [address] [place] [address] [place] [address]  Phone: [phone]  Fax: [phone] Plan Frequency: 2x per week for 6 weeks    Plan of Care/Certification Expiration Date: 24        Plan of Care/Certification Expiration Date:  Plan of Care/Certification Expiration Date: 24    Frequency/Duration: Plan Frequency: 2x per week for 6 weeks      Time In/Out:          PT Visit Info:    Plan Frequency: 2x per week for 6 weeks  Progress Note Counter: 1      Visit Count:  1                OUTPATIENT PHYSICAL THERAPY:             Initial Assessment 2023               Episode (right shoulder pain)         Treatment Diagnosis:     Chronic left shoulder pain  Stiffness of left shoulder, not elsewhere classified  Incomplete rotator cuff tear or rupture of left shoulder, not specified as traumatic  Medical/Referring Diagnosis:    No admission diagnoses are documented for this encounter. Referring Physician:  Mark Forde MD MD Orders:  PT Eval and Treat   Return MD Appt:  as needed  Date of Onset:  Onset Date: 22   surgery  Allergies:  Patient has no known allergies. Restrictions/Precautions:    None      Medications Last Reviewed:  2023     SUBJECTIVE   History of Injury/Illness (Reason for Referral):  Pt status post left cuff repair on 22 with labral debridement due to pain, frozen shoulder. She has had ongoing stiffness and lack of full strength needed for normal daily reaching, lifting and overhead use of the left shoulder. She still feels as though something is sitting on the shoulder. Second MRI in August revealed no additional pathology and a stable cuff repair. She has received steroid dose pack and a biceps steroid injection with mild relief of symptoms. Md has recommended continued therapy.   Patient Stated Goal(s):  \"to

## 2023-12-13 NOTE — PROGRESS NOTES
Navid Apa  : 1970  Primary: [employer] * (Worker's Comp)  Secondary:  [address] @ [facility]  [address] [facility] [place] [facility] [address]  Phone: [phone]  Fax: [phone] Plan Frequency: 2x per week for 6 weeks  Plan of Care/Certification Expiration Date: 24        Plan of Care/Certification Expiration Date:  Plan of Care/Certification Expiration Date: 24    Frequency/Duration: Plan Frequency: 2x per week for 6 weeks      Time In/Out:   Time In: 50  Time Out: 935      PT Visit Info:    Plan Frequency: 2x per week for 6 weeks  Progress Note Counter: 1      Visit Count:  1    OUTPATIENT PHYSICAL THERAPY:   Treatment Note 2023       Episode  (right shoulder pain)               Treatment Diagnosis:    Chronic left shoulder pain  Stiffness of left shoulder, not elsewhere classified  Incomplete rotator cuff tear or rupture of left shoulder, not specified as traumatic  Medical/Referring Diagnosis:    Chronic left shoulder pain    Referring Physician:  West Martins MD MD Orders:  PT Eval and Treat   Return MD Appt:  as needed   Date of Onset:  Onset Date: 22     Allergies:   Patient has no known allergies. Restrictions/Precautions:   None      Interventions Planned (Treatment may consist of any combination of the following):     See Assessment Note    Subjective Comments:   Pt reports pressure sensation on shoulder  Initial Pain Level[de-identified]     5/10  Post Session Pain Level:       5/10  Medications Last Reviewed:  2023  Updated Objective Findings:  See Evaluation Note from today  Treatment   THERAPEUTIC EXERCISE: (15 minutes):    Exercises per grid below to improve mobility and strength. Required minimal verbal cues to promote proper body mechanics. Progressed resistance, range, and repetitions as indicated.   Wall hand lift offs x 10  ER arms by side orange band x 20  Shoulder taps x 20  Corner stretching 3x hold 20 sec  MANUAL

## 2023-12-21 ENCOUNTER — APPOINTMENT (OUTPATIENT)
Dept: PHYSICAL THERAPY | Age: 53
End: 2023-12-21
Payer: COMMERCIAL

## 2023-12-27 ENCOUNTER — HOSPITAL ENCOUNTER (OUTPATIENT)
Dept: PHYSICAL THERAPY | Age: 53
Setting detail: RECURRING SERIES
End: 2023-12-27
Payer: COMMERCIAL

## 2023-12-27 NOTE — PROGRESS NOTES
Fina Young  : 1970  Primary: Eleuter[facility] Services *  Secondary:  [facility]  [address] [facility] [place] [address]  Phone: [phone]  Fax: [phone]    PT Visit Info:    Progress Note Counter: 1     OT Visit Info:  No data recorded    OUTPATIENT THERAPY: 2023  Episode  Appt Desk        Fina Young cancelled her appointment for today due to work related issues.  Will plan to follow up next during next appointment.  Thank you,  Chetna Lee PT    Future Appointments   Date Time Provider Department Center   2024  9:30 AM Chetna Lee, PT [facility]   1/10/2024  8:45 AM Chetna Lee, PT [facility]   2024  8:45 AM Chetna Lee, PT [facility]   2024  8:45 AM Chetna Lee, PT [facility]   2024 11:00 AM Nicole Moore MD [facility]   2024  8:45 AM Chetna Lee, PT [facility]   2024  8:45 AM Chetna Lee, PT [facility]   2/15/2024  8:45 AM Chetna Lee, PT [facility]   2024  8:45 AM Chetna Lee, PT [facility]   2024  8:45 AM Chetna Lee, PT [facility]   3/14/2024  8:20 AM Heladio Rodriguez MD [facility] AMB

## 2024-01-04 ENCOUNTER — HOSPITAL ENCOUNTER (OUTPATIENT)
Dept: PHYSICAL THERAPY | Age: 54
Setting detail: RECURRING SERIES
Discharge: HOME | End: 2024-01-07
Payer: COMMERCIAL

## 2024-01-04 PROCEDURE — 97140 MANUAL THERAPY 1/> REGIONS: CPT

## 2024-01-04 PROCEDURE — 97110 THERAPEUTIC EXERCISES: CPT

## 2024-01-04 ASSESSMENT — PAIN SCALES - GENERAL: PAINLEVEL_OUTOF10: 4

## 2024-01-04 NOTE — PROGRESS NOTES
Fina Young  : 1970  Primary: [employer] [facility] [facility] * (Worker's Comp)  Secondary:  [facility] @ [facility]  [address] [place] [address]  Phone: [phone]  Fax: [phone] Plan Frequency: 2x per week for 6 weeks  Plan of Care/Certification Expiration Date: 24        Plan of Care/Certification Expiration Date:  Plan of Care/Certification Expiration Date: 24    Frequency/Duration: Plan Frequency: 2x per week for 6 weeks      Time In/Out:   Time In: 930  Time Out: 1015      PT Visit Info:    Plan Frequency: 2x per week for 6 weeks  Progress Note Counter: 1      Visit Count:  2    OUTPATIENT PHYSICAL THERAPY:   Treatment Note 2024       Episode  (right shoulder pain)               Treatment Diagnosis:    No data found  Medical/Referring Diagnosis:    Chronic left shoulder pain    Referring Physician:  Heladio Rodriguez MD MD Orders:  PT Eval and Treat   Return MD Appt:  as needed   Date of Onset:  Onset Date: 22     Allergies:   Patient has no known allergies.  Restrictions/Precautions:   None      Interventions Planned (Treatment may consist of any combination of the following):     See Assessment Note    Subjective Comments:   Pt reports shoulder is the same  Initial Pain Level::     4/10  Post Session Pain Level:       3/10  Medications Last Reviewed:  2024  Updated Objective Findings:  None Today  Treatment   THERAPEUTIC EXERCISE: (35 minutes):    Exercises per grid below to improve mobility and strength.  Required minimal verbal cues to promote proper body mechanics.  Progressed resistance, range, and repetitions as indicated.  UBE x 7 min level 3  Wall hand lift offs x 10  ER arms by side orange band x 20  Shoulder taps x 20  Corner stretching 3x hold 20 sec  Bent over rows 9# x 30  Shoulder h. Abd/ add green TB x 20  Supine flexion 3# x 25  Overhead pull down 7# x 20  Overhead press 3# x 20  MANUAL THERAPY: (10 minutes):

## 2024-01-10 ENCOUNTER — HOSPITAL ENCOUNTER (OUTPATIENT)
Dept: PHYSICAL THERAPY | Age: 54
Setting detail: RECURRING SERIES
Discharge: HOME | End: 2024-01-13
Payer: COMMERCIAL

## 2024-01-10 PROCEDURE — 97110 THERAPEUTIC EXERCISES: CPT

## 2024-01-10 ASSESSMENT — PAIN SCALES - GENERAL: PAINLEVEL_OUTOF10: 4

## 2024-01-10 NOTE — PROGRESS NOTES
Fina Young  : 1970  Primary: [employer] [facility] * (Worker's Comp)  Secondary:  [facility] @ [facility]  [address] [place] [address]  Phone: [phone]  Fax: [phone] Plan Frequency: 2x per week for 6 weeks  Plan of Care/Certification Expiration Date: 24        Plan of Care/Certification Expiration Date:  Plan of Care/Certification Expiration Date: 24    Frequency/Duration: Plan Frequency: 2x per week for 6 weeks      Time In/Out:   Time In: 45  Time Out: 930      PT Visit Info:    Plan Frequency: 2x per week for 6 weeks  Progress Note Counter: 3      Visit Count:  3    OUTPATIENT PHYSICAL THERAPY:   Treatment Note 1/10/2024       Episode  (right shoulder pain)               Treatment Diagnosis:    No data found  Medical/Referring Diagnosis:    Chronic left shoulder pain    Referring Physician:  Heladio Rodriguez MD MD Orders:  PT Eval and Treat   Return MD Appt:  as needed   Date of Onset:  Onset Date: 22     Allergies:   Patient has no known allergies.  Restrictions/Precautions:   None      Interventions Planned (Treatment may consist of any combination of the following):     See Assessment Note    Subjective Comments:   Pt reports shoulder is the same.  Some pain from elbow down to hand  Initial Pain Level::     4/10  Post Session Pain Level:       3/10  Medications Last Reviewed:  1/10/2024  Updated Objective Findings:  None Today  Treatment   THERAPEUTIC EXERCISE: (40 minutes):    Exercises per grid below to improve mobility and strength.  Required minimal verbal cues to promote proper body mechanics.  Progressed resistance, range, and repetitions as indicated.  UBE x 6 min level 3  Wall hand lift offs x 10  ER arms by side orange band x 20  Shoulder taps x 20  Corner stretching 3x hold 20 sec  Bent over rows 9# x 30  Shoulder h. Abd/ add green TB x 20  Standing flexion 3# x 25  Overhead pull down 7# x 25  Overhead press 4# x

## 2024-01-18 ENCOUNTER — HOSPITAL ENCOUNTER (OUTPATIENT)
Dept: PHYSICAL THERAPY | Age: 54
Setting detail: RECURRING SERIES
Discharge: HOME | End: 2024-01-21
Payer: COMMERCIAL

## 2024-01-18 PROCEDURE — 97110 THERAPEUTIC EXERCISES: CPT

## 2024-01-18 ASSESSMENT — PAIN SCALES - GENERAL: PAINLEVEL_OUTOF10: 3

## 2024-01-18 NOTE — PROGRESS NOTES
Fina Young  : 1970  Primary: [employer] * (Worker's Comp)  Secondary:  [facility] @ [facility]  [address] [place] [address]  Phone: [phone]  Fax: [phone] Plan Frequency: 2x per week for 6 weeks  Plan of Care/Certification Expiration Date: 24        Plan of Care/Certification Expiration Date:  Plan of Care/Certification Expiration Date: 24    Frequency/Duration: Plan Frequency: 2x per week for 6 weeks      Time In/Out:   Time In: 0845  Time Out: 930      PT Visit Info:    Plan Frequency: 2x per week for 6 weeks  Progress Note Counter: 4      Visit Count:  4    OUTPATIENT PHYSICAL THERAPY:   Treatment Note 2024       Episode  (right shoulder pain)               Treatment Diagnosis:    No data found  Medical/Referring Diagnosis:    Chronic left shoulder pain    Referring Physician:  Heladio Rodriguez MD MD Orders:  PT Eval and Treat   Return MD Appt:  as needed   Date of Onset:  Onset Date: 22     Allergies:   Patient has no known allergies.  Restrictions/Precautions:   None      Interventions Planned (Treatment may consist of any combination of the following):     See Assessment Note    Subjective Comments:   Pt reports shoulder is the same.  Only mild change in heaviness related to the shoulder.  Initial Pain Level::     3/10  Post Session Pain Level:       2/10  Medications Last Reviewed:  2024  Updated Objective Findings:  None Today  Treatment   THERAPEUTIC EXERCISE: (40 minutes):    Exercises per grid below to improve mobility and strength.  Required minimal verbal cues to promote proper body mechanics.  Progressed resistance, range, and repetitions as indicated.  UBE x 7 min level 3  Wall hand lift offs x 10-held  ER arms by side orange band x 20  Shoulder taps x 20  Corner stretching 3x hold 20 sec  Bent over rows 9# x 30  Shoulder h. Abd/ add green TB x 20 with shoulder flexion  Supine flexion 3# x

## 2024-01-24 ENCOUNTER — OFFICE VISIT (OUTPATIENT)
Dept: ORTHOPEDIC SURGERY | Age: 54
End: 2024-01-24

## 2024-01-24 ENCOUNTER — HOSPITAL ENCOUNTER (OUTPATIENT)
Dept: PHYSICAL THERAPY | Age: 54
Setting detail: RECURRING SERIES
Discharge: HOME | End: 2024-01-27
Payer: COMMERCIAL

## 2024-01-24 DIAGNOSIS — G56.02 LEFT CARPAL TUNNEL SYNDROME: ICD-10-CM

## 2024-01-24 DIAGNOSIS — M65.9 SYNOVITIS AND TENOSYNOVITIS: ICD-10-CM

## 2024-01-24 DIAGNOSIS — G56.22 CUBITAL TUNNEL SYNDROME ON LEFT: ICD-10-CM

## 2024-01-24 DIAGNOSIS — M19.90 INFLAMMATORY ARTHROPATHY: ICD-10-CM

## 2024-01-24 DIAGNOSIS — M19.032 ARTHRITIS OF LEFT WRIST: Primary | ICD-10-CM

## 2024-01-24 PROCEDURE — 97110 THERAPEUTIC EXERCISES: CPT

## 2024-01-24 RX ORDER — BETAMETHASONE SODIUM PHOSPHATE AND BETAMETHASONE ACETATE 3; 3 MG/ML; MG/ML
6 INJECTION, SUSPENSION INTRA-ARTICULAR; INTRALESIONAL; INTRAMUSCULAR; SOFT TISSUE ONCE
Status: COMPLETED | OUTPATIENT
Start: 2024-01-24 | End: 2024-01-24

## 2024-01-24 RX ORDER — BETAMETHASONE SODIUM PHOSPHATE AND BETAMETHASONE ACETATE 3; 3 MG/ML; MG/ML
12 INJECTION, SUSPENSION INTRA-ARTICULAR; INTRALESIONAL; INTRAMUSCULAR; SOFT TISSUE ONCE
Status: COMPLETED | OUTPATIENT
Start: 2024-01-24 | End: 2024-01-24

## 2024-01-24 RX ORDER — MELOXICAM 15 MG/1
15 TABLET ORAL DAILY
Qty: 60 TABLET | Refills: 0 | Status: SHIPPED | OUTPATIENT
Start: 2024-01-24 | End: 2024-03-24

## 2024-01-24 RX ADMIN — BETAMETHASONE SODIUM PHOSPHATE AND BETAMETHASONE ACETATE 12 MG: 3; 3 INJECTION, SUSPENSION INTRA-ARTICULAR; INTRALESIONAL; INTRAMUSCULAR; SOFT TISSUE at 12:01

## 2024-01-24 RX ADMIN — BETAMETHASONE SODIUM PHOSPHATE AND BETAMETHASONE ACETATE 6 MG: 3; 3 INJECTION, SUSPENSION INTRA-ARTICULAR; INTRALESIONAL; INTRAMUSCULAR; SOFT TISSUE at 12:01

## 2024-01-24 ASSESSMENT — PAIN SCALES - GENERAL: PAINLEVEL_OUTOF10: 3

## 2024-01-24 NOTE — PROGRESS NOTES
3# x 25-held  Overhead pull down 10# x 30  Overhead press 4# x 25  Double arm rows 17# x 25  Standing ER 5# 3 x 10  Wall press x 15  MANUAL THERAPY: (5 minutes):   Joint mobilization and Soft tissue mobilization was utilized and necessary because of the patient's restricted joint motion and painful spasm.   GHJ inferior and posterior mobs Grade III  First rib mobs  Upper trap STM    Treatment/Session Summary:    Treatment Assessment:   Pt taking frequent rest breaks in between sets, but able to complete all exercises.  She is progressing with strength overall and has full active motion.  Some complaints of hand pain at times.  Communication/Consultation:  None today  Equipment provided today:  None  Recommendations/Intent for next treatment session: Next visit will focus on strengthening, ROM, manual therapy.    >Total Treatment Billable Duration:  45 minutes   Time In: 0845  Time Out: 0930    Chetna Lee PT         Charge Capture  [employer] Portal  Appt Desk     Future Appointments   Date Time Provider Department Center   1/24/2024 11:00 AM Nicole Moore MD [facility]   2/1/2024  8:45 AM Chetna Lee, PT [facility]   2/7/2024  8:45 AM Chetna Lee, PT [facility]   2/15/2024  8:45 AM Chetna Lee, PT [facility]   2/21/2024  8:45 AM Chetna Lee, PT [facility]   2/29/2024  8:45 AM Chetna Lee, PT [facility]   3/14/2024  8:20 AM Heladio Rodriguez MD [facility]

## 2024-01-24 NOTE — PROGRESS NOTES
Orthopaedic Hand Clinic Note    Name: Fina Young  Age: 53 y.o.  YOB: 1970  Gender: female  MRN: [id number]      Follow up visit:   1. Arthritis of left wrist    2. Cubital tunnel syndrome on left    3. Left carpal tunnel syndrome    4. Synovitis and tenosynovitis    5. Inflammatory arthropathy        HPI: Fina Young is a 53 y.o. female who is following up for left hand numbness and paresthesias, all her symptoms started after COVID a year ago, she is complaining of more pain in the wrist especially on the ulnar side, this is severely interfering with her everyday life and work.      ROS/Meds/PSH/PMH/FH/SH: I personally reviewed the patients standard intake form.  Pertinents are discussed in the HPI    Physical Examination:  General: Awake and alert.  HEENT: Normocephalic, atraumatic  CV/Pulm: Breathing even and unlabored  Skin: No obvious rashes noted.  Lymphatic: No obvious evidence of lymphedema or lymphadenopathy    Musculoskeletal Examination:  Examination on the left upper extremity demonstrates cap refill < 5 seconds in all fingers, severe tenderness palpation of the pisiform triquetral joint, very minimal at the DRUJ, TFCC fovea, ECU groove the radiocarpal joint, she has a negative Tinel at the carpal tunnel but discomfort with Guyon canal compression and discomfort with Tinel at Guyon's canal, she has positive provocative maneuvers for cubital tunnel syndrome.    Imaging / Electrodiagnostic Tests:     left Hand XR: AP, Lateral, Oblique and Thumb CMC joint     Clinical Indication:  1. Arthritis of left wrist    2. Cubital tunnel syndrome on left    3. Left carpal tunnel syndrome    4. Synovitis and tenosynovitis    5. Inflammatory arthropathy           Report: AP, lateral, oblique and thumb CMC joint hand XRs demonstrates well-maintained joint spaces without evidence of fracture or dislocations    Impression: as above     Nicole Moore MD       Nerve conduction study were again

## 2024-01-25 DIAGNOSIS — M65.9 SYNOVITIS AND TENOSYNOVITIS: ICD-10-CM

## 2024-01-25 DIAGNOSIS — M19.90 INFLAMMATORY ARTHROPATHY: ICD-10-CM

## 2024-01-25 LAB
ALBUMIN SERPL-MCNC: 4.1 G/DL (ref 3.5–5)
ALBUMIN/GLOB SERPL: 1 (ref 0.4–1.6)
ALP SERPL-CCNC: 109 U/L (ref 50–136)
ALT SERPL-CCNC: 20 U/L (ref 12–65)
ANION GAP SERPL CALC-SCNC: 7 MMOL/L (ref 2–11)
AST SERPL-CCNC: 10 U/L (ref 15–37)
BASOPHILS # BLD: 0 K/UL (ref 0–0.2)
BASOPHILS NFR BLD: 0 % (ref 0–2)
BILIRUB SERPL-MCNC: 0.3 MG/DL (ref 0.2–1.1)
BUN SERPL-MCNC: 16 MG/DL (ref 6–23)
CALCIUM SERPL-MCNC: 11.3 MG/DL (ref 8.3–10.4)
CHLORIDE SERPL-SCNC: 109 MMOL/L (ref 103–113)
CO2 SERPL-SCNC: 22 MMOL/L (ref 21–32)
CREAT SERPL-MCNC: 1.2 MG/DL (ref 0.6–1)
CRP SERPL-MCNC: 0.4 MG/DL (ref 0–0.9)
DIFFERENTIAL METHOD BLD: ABNORMAL
EOSINOPHIL # BLD: 0 K/UL (ref 0–0.8)
EOSINOPHIL NFR BLD: 0 % (ref 0.5–7.8)
ERYTHROCYTE [DISTWIDTH] IN BLOOD BY AUTOMATED COUNT: 12.8 % (ref 11.9–14.6)
GLOBULIN SER CALC-MCNC: 4 G/DL (ref 2.8–4.5)
GLUCOSE SERPL-MCNC: 146 MG/DL (ref 65–100)
HCT VFR BLD AUTO: 41.6 % (ref 35.8–46.3)
HGB BLD-MCNC: 14 G/DL (ref 11.7–15.4)
IMM GRANULOCYTES # BLD AUTO: 0.1 K/UL (ref 0–0.5)
IMM GRANULOCYTES NFR BLD AUTO: 1 % (ref 0–5)
LYMPHOCYTES # BLD: 0.9 K/UL (ref 0.5–4.6)
LYMPHOCYTES NFR BLD: 6 % (ref 13–44)
MCH RBC QN AUTO: 32.5 PG (ref 26.1–32.9)
MCHC RBC AUTO-ENTMCNC: 33.7 G/DL (ref 31.4–35)
MCV RBC AUTO: 96.5 FL (ref 82–102)
MONOCYTES # BLD: 0.6 K/UL (ref 0.1–1.3)
MONOCYTES NFR BLD: 4 % (ref 4–12)
NEUTS SEG # BLD: 13.1 K/UL (ref 1.7–8.2)
NEUTS SEG NFR BLD: 89 % (ref 43–78)
NRBC # BLD: 0 K/UL (ref 0–0.2)
PLATELET # BLD AUTO: 346 K/UL (ref 150–450)
PMV BLD AUTO: 10.3 FL (ref 9.4–12.3)
POTASSIUM SERPL-SCNC: 3.9 MMOL/L (ref 3.5–5.1)
PROT SERPL-MCNC: 8.1 G/DL (ref 6.3–8.2)
RBC # BLD AUTO: 4.31 M/UL (ref 4.05–5.2)
SODIUM SERPL-SCNC: 138 MMOL/L (ref 136–146)
URATE SERPL-MCNC: 4.8 MG/DL (ref 2.6–6)
WBC # BLD AUTO: 14.7 K/UL (ref 4.3–11.1)

## 2024-01-26 LAB
ANA SER QL: NEGATIVE
CCP IGA+IGG SERPL IA-ACNC: 7 UNITS (ref 0–19)
ERYTHROCYTE [SEDIMENTATION RATE] IN BLOOD: 51 MM/HR (ref 0–30)
RHEUMATOID FACT SER QL LA: NEGATIVE

## 2024-01-31 LAB — HLA-B27 QL NAA+PROBE: NEGATIVE

## 2024-02-01 ENCOUNTER — HOSPITAL ENCOUNTER (OUTPATIENT)
Dept: PHYSICAL THERAPY | Age: 54
Setting detail: RECURRING SERIES
Discharge: HOME | End: 2024-02-04
Payer: COMMERCIAL

## 2024-02-01 PROCEDURE — 97110 THERAPEUTIC EXERCISES: CPT

## 2024-02-01 ASSESSMENT — PAIN SCALES - GENERAL: PAINLEVEL_OUTOF10: 2

## 2024-02-01 NOTE — PROGRESS NOTES
Soft tissue mobilization was utilized and necessary because of the patient's restricted joint motion and painful spasm.   GHJ inferior and posterior mobs Grade III  First rib mobs  Upper trap STM    Treatment/Session Summary:    Treatment Assessment:   Patient did very well today, with addition of single arm doorway pec stretch and cable rows. Overall, she is demonstrating good AROM and strength.   Communication/Consultation:  None today  Equipment provided today:  None  Recommendations/Intent for next treatment session: Next visit will focus on strengthening, ROM, manual therapy.    >Total Treatment Billable Duration:  45 minutes   Time In: 0845  Time Out: 0930    Heladio Barba, PT         Charge Capture  [employer] Portal  Appt Desk     Future Appointments   Date Time Provider Department Center   2/7/2024  8:45 AM Chetna Lee, PT [facility]   2/15/2024  8:45 AM Chetna Lee, PT [facility]   2/21/2024  8:45 AM Chetna Lee, PT [facility]   2/21/2024 11:15 AM Nicole Moore MD [facility]   2/29/2024  8:45 AM Chetna Lee, PT [facility]   3/14/2024  8:20 AM Heladio Rodriguez MD [facility]

## 2024-02-07 ENCOUNTER — HOSPITAL ENCOUNTER (OUTPATIENT)
Dept: PHYSICAL THERAPY | Age: 54
Setting detail: RECURRING SERIES
End: 2024-02-07
Payer: COMMERCIAL

## 2024-02-07 NOTE — PROGRESS NOTES
Fina Young  : 1970  Primary: [employer] *  Secondary:  [facility]  [address] [place] [address]  [place] [address]  Phone: [phone]  Fax: [phone]    PT Visit Info:    Progress Note Counter: 5     OT Visit Info:  No data recorded    OUTPATIENT THERAPY: 2024  Episode  Appt Desk        Fina Young cancelled her appointment for today due to unknown reasons.  Will plan to follow up next during next appointment.  Thank you,  Chetna Lee, PT    Future Appointments   Date Time Provider Department Center   2/15/2024  8:45 AM Chetna Lee, PT [facility]   2024  8:45 AM Chetna Lee, PT [facility]   2024 11:15 AM Nicole Moore MD [facility]   2024  8:45 AM Chetna Lee, PT [facility]   3/14/2024  8:20 AM Heladio Rodriguez MD [facility]

## 2024-02-15 ENCOUNTER — HOSPITAL ENCOUNTER (OUTPATIENT)
Dept: PHYSICAL THERAPY | Age: 54
Setting detail: RECURRING SERIES
Discharge: HOME | End: 2024-02-18
Payer: COMMERCIAL

## 2024-02-15 PROCEDURE — 97110 THERAPEUTIC EXERCISES: CPT

## 2024-02-15 ASSESSMENT — PAIN SCALES - GENERAL: PAINLEVEL_OUTOF10: 0

## 2024-02-15 NOTE — PLAN OF CARE
History/Comorbidities:   Ms. Young  has a past medical history of Tachycardia.  Ms. Young  has a past surgical history that includes Shoulder arthroscopy (Left, 12/9/2022).  Social History/Living Environment:   PT/OT Social History/Living Environment: Patient lives with their spouse    Prior Level of Function/Work/Activity:   PT/OT Level of Function/Work/Activity: Prior Level of Function: Independent      Learning:   Does the patient/guardian have any barriers to learning?: No barriers  Will there be a co-learner?: No  What is the preferred language of the patient/guardian?: English  Is an  required?: No  How does the patient/guardian prefer to learn new concepts?: Listening; Demonstration; Pictures/Videos    Fall Risk Scale:   Tobar Total Score: 0        OBJECTIVE   UE ROM:       R UE   LE UE   Flex 150 °   150 ° AROM      Abd 160 °   145 ° AROM     ER 75 °   60   IR 80 °   70       UE strength measures:     R UE L UE   Flex   5/5   4/5   Abduction  5 /5   4-/5   ER  5 /5  4- /5   IR  5 /5   4/5   biceps  5 /5   4+/5   triceps   5/5  4 +/5     Pt is left hand dominant  ASSESSMENT   Initial Assessment:  Pt status post right cuff repair on 12/9/22.  She has been in and out of therapy working on strength, motion and pain levels.  Has had steroid injection as well as oral steroids to help with her pain levels. She has been able to continue working, but reports higher level tasks that involve the shoulder like heavy lifting or overhead reaching and lifting can be difficult.  She presents today with decreased active shoulder motion and tenderness noted along clavicle and into left upper trap from compensatory actions.  Assessment as of 2/15/24:  Pt reports no pain at rest.  She still has some complaints of pressure at the shoulder and some issues with the hand.  She can perform multiple strengthening exercises and has improved AROM by at least 20 degrees for flexion and abduction.  Strength is slowly improving

## 2024-02-15 NOTE — PROGRESS NOTES
Fina Young  : 1970  Primary: [employer] * (Worker's Comp)  Secondary:  [facility] @ [facility]  [address] [place] [address]  Phone: [phone]  Fax: [phone] Plan Frequency: 2x per week for 8 weeks  Plan of Care/Certification Expiration Date: 04/15/24        Plan of Care/Certification Expiration Date:  Plan of Care/Certification Expiration Date: 04/15/24    Frequency/Duration: Plan Frequency: 2x per week for 8 weeks      Time In/Out:   Time In: 0845  Time Out: 930      PT Visit Info:    Plan Frequency: 2x per week for 8 weeks  Progress Note Counter: 6      Visit Count:  6    OUTPATIENT PHYSICAL THERAPY:   Treatment Note 2/15/2024       Episode  (right shoulder pain)               Treatment Diagnosis:    No data found  Medical/Referring Diagnosis:    Chronic left shoulder pain    Referring Physician:  Heladio Rodriguez MD MD Orders:  PT Eval and Treat   Return MD Appt:  as needed   Date of Onset:  Onset Date: 22     Allergies:   Patient has no known allergies.  Restrictions/Precautions:   None      Interventions Planned (Treatment may consist of any combination of the following):     See Assessment Note    Subjective Comments:   Pt reports she feels about the same.  No worsening.  Initial Pain Level::     0/10  Post Session Pain Level:       0/10  Medications Last Reviewed:  2/15/2024  Updated Objective Findings:  None Today  Treatment   THERAPEUTIC EXERCISE: (40 minutes):    Exercises per grid below to improve mobility and strength.  Required minimal verbal cues to promote proper body mechanics.  Progressed resistance, range, and repetitions as indicated.  UBE x 6 min level 3  Wall hand lift offs x 10-held  ER arms by side orange band x 20-held  Shoulder taps x 20  Corner stretching 3x hold 20 sec  Bent over rows 9# x 30  Shoulder h. Abd/ add green TB x 20 with shoulder flexion  Supine flexion 3# x 25  Overhead pull down 7# x

## 2024-02-16 ENCOUNTER — TELEPHONE (OUTPATIENT)
Dept: ORTHOPEDIC SURGERY | Age: 54
End: 2024-02-16

## 2024-02-16 NOTE — TELEPHONE ENCOUNTER
She missed a call yesterday from Dr. Moore about some test results. Please ask him to try her again.

## 2024-02-21 ENCOUNTER — HOSPITAL ENCOUNTER (OUTPATIENT)
Dept: PHYSICAL THERAPY | Age: 54
Setting detail: RECURRING SERIES
Discharge: HOME | End: 2024-02-24
Payer: COMMERCIAL

## 2024-02-21 PROCEDURE — 97110 THERAPEUTIC EXERCISES: CPT

## 2024-02-21 NOTE — PROGRESS NOTES
Fina K Hector  : 1970  Primary: [employer] * (Worker's Comp)  Secondary:  [facility]  [address] [place] [address]  Phone: [phone]  Fax: [phone] Plan Frequency: 2x per week for 8 weeks  Plan of Care/Certification Expiration Date: 04/15/24        Plan of Care/Certification Expiration Date:  Plan of Care/Certification Expiration Date: 04/15/24    Frequency/Duration: Plan Frequency: 2x per week for 8 weeks      Time In/Out:   Time In: 0845  Time Out: 920      PT Visit Info:    Plan Frequency: 2x per week for 8 weeks  Progress Note Counter: 1      Visit Count:  37    OUTPATIENT PHYSICAL THERAPY:   Treatment Note 2024       Episode  (RCR)               Treatment Diagnosis:    No data found  Medical/Referring Diagnosis:    Chronic left shoulder pain    Referring Physician:  Heladio Rodriguez MD MD Orders:  PT Eval and Treat   Return MD Appt:  as needed   Date of Onset:  Onset Date: 22     Allergies:   Patient has no known allergies.  Restrictions/Precautions:   None      Interventions Planned (Treatment may consist of any combination of the following):     See Assessment Note    Subjective Comments:   Pt reports she feels the same.  Her hand feels like the pain is returning.  Initial Pain Level::      /10  Post Session Pain Level:        /10  Medications Last Reviewed:  2024  Updated Objective Findings:  None Today  Treatment   THERAPEUTIC EXERCISE: (35 minutes):    Exercises per grid below to improve mobility and strength.  Required minimal verbal cues to promote proper body mechanics.  Progressed resistance, range, and repetitions as indicated.  UBE x 6 min level 3  Wall hand lift offs x 10-held  ER arms by side orange band x 20-  Shoulder taps x 20-held  Corner stretching 3x hold 20 sec-held  Bent over rows 9# x 30-held  Shoulder h. Abd/ add green TB 3 x 10 with shoulder flexion  Supine flexion 3# x 25-held  Overhead

## 2024-02-29 ENCOUNTER — HOSPITAL ENCOUNTER (OUTPATIENT)
Dept: PHYSICAL THERAPY | Age: 54
Setting detail: RECURRING SERIES
End: 2024-02-29
Payer: COMMERCIAL

## 2024-02-29 PROCEDURE — 97110 THERAPEUTIC EXERCISES: CPT

## 2024-02-29 ASSESSMENT — PAIN SCALES - GENERAL: PAINLEVEL_OUTOF10: 0

## 2024-02-29 NOTE — PROGRESS NOTES
Fina Young  : 1970  Primary: [employer] * (Worker's Comp)  Secondary:  [facility]  [address] [place] [address]  Phone: [phone]  Fax: [phone] Plan Frequency: 2x per week for 8 weeks  Plan of Care/Certification Expiration Date: 04/15/24        Plan of Care/Certification Expiration Date:  Plan of Care/Certification Expiration Date: 04/15/24    Frequency/Duration: Plan Frequency: 2x per week for 8 weeks      Time In/Out:   Time In: 0850  Time Out: 0930      PT Visit Info:    Plan Frequency: 2x per week for 8 weeks  Progress Note Counter: 2      Visit Count:  38    OUTPATIENT PHYSICAL THERAPY:   Treatment Note 2024       Episode  (RCR)               Treatment Diagnosis:    No data found  Medical/Referring Diagnosis:    Chronic left shoulder pain    Referring Physician:  Heladio Rodriguez MD MD Orders:  PT Eval and Treat   Return MD Appt:  as needed   Date of Onset:  Onset Date: 22     Allergies:   Patient has no known allergies.  Restrictions/Precautions:   None      Interventions Planned (Treatment may consist of any combination of the following):     See Assessment Note    Subjective Comments:   Pt reports she feels the same.  No pain just heaviness.  Initial Pain Level::     0/10  Post Session Pain Level:       0/10  Medications Last Reviewed:  2024  Updated Objective Findings:  None Today  Treatment   THERAPEUTIC EXERCISE: (40 minutes):    Exercises per grid below to improve mobility and strength.  Required minimal verbal cues to promote proper body mechanics.  Progressed resistance, range, and repetitions as indicated.  UBE x 6 min level 3  ER arms by side orange band x 30-  Shoulder taps x 30  Corner stretching 3x hold 20 sec-  Bent over rows 9# x 30-held  Shoulder h. Abd/ add green TB 3 x 10 with shoulder flexion  Supine flexion 3# x 30  Overhead pull down 7# x 30  Overhead press 5# 3 x 10-  Double arm rows

## 2024-03-04 DIAGNOSIS — G56.22 CUBITAL TUNNEL SYNDROME ON LEFT: ICD-10-CM

## 2024-03-04 DIAGNOSIS — G56.02 LEFT CARPAL TUNNEL SYNDROME: ICD-10-CM

## 2024-03-04 RX ORDER — MELOXICAM 15 MG/1
15 TABLET ORAL DAILY
Qty: 60 TABLET | Refills: 0 | OUTPATIENT
Start: 2024-03-04 | End: 2024-05-03

## 2024-03-13 ENCOUNTER — PATIENT MESSAGE (OUTPATIENT)
Dept: ORTHOPEDIC SURGERY | Age: 54
End: 2024-03-13

## 2024-03-13 NOTE — TELEPHONE ENCOUNTER
From: Fina Young  To: Dr. Nicole Moore  Sent: 3/13/2024 7:24 AM EDT  Subject: Appointment Request    Appointment Request From: Fina Young    [facility] Provider[facility]    Preferred Date Range: 4/11/2024 – 4/11/2024    Preferred Times: Any Time    Reason for visit: Request an Appointment    Comments:  Cancel March 15th no coverage at work ,short staff April 11th I will be off

## 2024-03-19 DIAGNOSIS — G56.22 CUBITAL TUNNEL SYNDROME ON LEFT: ICD-10-CM

## 2024-03-19 DIAGNOSIS — G56.02 LEFT CARPAL TUNNEL SYNDROME: ICD-10-CM

## 2024-03-19 RX ORDER — MELOXICAM 15 MG/1
15 TABLET ORAL DAILY
Qty: 60 TABLET | Refills: 0 | OUTPATIENT
Start: 2024-03-19 | End: 2024-05-18

## 2024-04-17 ENCOUNTER — OFFICE VISIT (OUTPATIENT)
Dept: ORTHOPEDIC SURGERY | Age: 54
End: 2024-04-17

## 2024-04-17 DIAGNOSIS — M19.032 ARTHRITIS OF LEFT WRIST: Primary | ICD-10-CM

## 2024-04-17 DIAGNOSIS — G56.22 CUBITAL TUNNEL SYNDROME ON LEFT: ICD-10-CM

## 2024-04-17 DIAGNOSIS — G56.02 LEFT CARPAL TUNNEL SYNDROME: ICD-10-CM

## 2024-04-17 RX ORDER — BETAMETHASONE SODIUM PHOSPHATE AND BETAMETHASONE ACETATE 3; 3 MG/ML; MG/ML
6 INJECTION, SUSPENSION INTRA-ARTICULAR; INTRALESIONAL; INTRAMUSCULAR; SOFT TISSUE ONCE
Status: COMPLETED | OUTPATIENT
Start: 2024-04-17 | End: 2024-04-17

## 2024-04-17 RX ADMIN — BETAMETHASONE SODIUM PHOSPHATE AND BETAMETHASONE ACETATE 6 MG: 3; 3 INJECTION, SUSPENSION INTRA-ARTICULAR; INTRALESIONAL; INTRAMUSCULAR; SOFT TISSUE at 09:27

## 2024-04-17 NOTE — PROGRESS NOTES
Orthopaedic Hand Clinic Note    Name: Fina Young  Age: 53 y.o.  YOB: 1970  Gender: female  MRN: [id number]      Follow up visit:   1. Arthritis of left wrist    2. Cubital tunnel syndrome on left    3. Left carpal tunnel syndrome        HPI: Fina Young is a 53 y.o. female who is following up for left wrist pain, left hand numbness and paresthesias, on the last visit I performed a pisiform triquetral joint steroid injection as well as a cubital tunnel steroid injection, these injections have more of a diagnostic purpose than therapeutic, patient reports that the injections helped a lot, for about 5 weeks she felt great, she could move better, she did not have much pain in the palm and the numbness was improved, she reports after 5 weeks symptoms started to recur, most of her pain today is described as pressure along the wrist especially when putting pressure on it but it sometimes radiates down into the forearm.      ROS/Meds/PSH/PMH/FH/SH: I personally reviewed the patients standard intake form.  Pertinents are discussed in the HPI    Physical Examination:  General: Awake and alert.  HEENT: Normocephalic, atraumatic  CV/Pulm: Breathing even and unlabored  Skin: No obvious rashes noted.  Lymphatic: No obvious evidence of lymphedema or lymphadenopathy    Musculoskeletal Examination:  Examination on the left upper extremity demonstrates cap refill < 5 seconds in all fingers, severe tenderness palpation of the pisiform triquetral joint, very minimal at the DRUJ, TFCC fovea, ECU groove the radiocarpal joint, she has a negative Tinel at the carpal tunnel but discomfort with Guyon canal compression and discomfort with Tinel at Guyon's canal, she has positive provocative maneuvers for cubital tunnel syndrome.    Imaging / Electrodiagnostic Tests:     None new    Assessment:   1. Arthritis of left wrist    2. Cubital tunnel syndrome on left    3. Left carpal tunnel syndrome        Plan:   We discussed

## 2024-05-13 ENCOUNTER — OFFICE VISIT (OUTPATIENT)
Dept: ORTHOPEDIC SURGERY | Age: 54
End: 2024-05-13
Payer: COMMERCIAL

## 2024-05-13 DIAGNOSIS — Z98.890 STATUS POST ROTATOR CUFF REPAIR: ICD-10-CM

## 2024-05-13 DIAGNOSIS — M75.22 BICEPS TENDINITIS OF LEFT SHOULDER: ICD-10-CM

## 2024-05-13 DIAGNOSIS — G89.29 CHRONIC LEFT SHOULDER PAIN: Primary | ICD-10-CM

## 2024-05-13 DIAGNOSIS — Z09 S/P ORTHOPEDIC SURGERY, FOLLOW-UP EXAM: ICD-10-CM

## 2024-05-13 DIAGNOSIS — M25.512 CHRONIC LEFT SHOULDER PAIN: Primary | ICD-10-CM

## 2024-05-13 PROCEDURE — 99455 WORK RELATED DISABILITY EXAM: CPT | Performed by: ORTHOPAEDIC SURGERY

## 2024-05-13 NOTE — PROGRESS NOTES
Name: Fina Young  YOB: 1970  Gender: female  MRN: [id number]      CC: Shoulder Pain (L)     HPI: Fina Young is a 53 y.o. female who returns for follow up on her left shoulder.  She states that she is doing well overall.  She does report knots on the top of her shoulder specifically in her trapezius and up into her levator.  She is still experiencing shooting pain from her fingers into her elbow and is scheduled to see Dr. Moore for this issue.  She has not tried any deep tissue massage or dry needling or sought any treatment to and regarding the knots in her shoulder.  Overall she is much improved from previously and is pleased with her progress.  She is able to work her regular job.      Physical Examination:  General: no acute distress  Lungs: breathing easily  CV: regular rhythm by pulse  Left shoulder: Vague mild tenderness palpation around the AC joint and soft tissues anteriorly.  She is able to demonstrate full range of motion today which is much improved.  Mildly positive impingement signs are much improved 5 out of 5 resisted rotator cuff strength no significant pain with Taylor's [place]'s or speeds.  Motor and sensory intact grossly.        Assessment:     ICD-10-CM    1. Chronic left shoulder pain  M25.512     G89.29       2. Biceps tendinitis of left shoulder  M75.22       3. S/P orthopedic surgery, follow-up exam  Z09       4. Status post rotator cuff repair  Z98.890           Plan:   She is made good progress compared to where she was previously.  We will place her at [facility].  She will be regular work duty without permanent work restrictions.  I do anticipate her needing a course of physical therapy 2 times a week for 6 weeks once a year for the next 2 years.  Otherwise no further medical treatment planned for her left shoulder.  I will see her back as needed.            Heladoi Rodriguez MD, FAAOS  Orthopaedics and Sports Medicine

## 2024-10-25 ENCOUNTER — OFFICE VISIT (OUTPATIENT)
Age: 54
End: 2024-10-25

## 2024-10-25 DIAGNOSIS — G62.9 NEUROPATHY: Primary | ICD-10-CM

## 2024-10-25 DIAGNOSIS — G56.22 CUBITAL TUNNEL SYNDROME ON LEFT: ICD-10-CM

## 2024-10-25 DIAGNOSIS — G56.02 LEFT CARPAL TUNNEL SYNDROME: ICD-10-CM

## 2024-10-25 DIAGNOSIS — M19.032 ARTHRITIS OF LEFT WRIST: ICD-10-CM

## 2024-10-25 NOTE — PROGRESS NOTES
Orthopaedic Hand Clinic Note    Name: Fina Young  Age: 54 y.o.  YOB: 1970  Gender: female  MRN: [id number]      Follow up visit:   1. Neuropathy    2. Arthritis of left wrist    3. Cubital tunnel syndrome on left    4. Left carpal tunnel syndrome        HPI: Fina Young is a 54 y.o. female who is following up for left hand numbness and paresthesias, on previous visit I had performed cubital tunnel injection NP-C form triquetral joint steroid injection, these injections provided great relief but only for about 5 weeks, on the last visit I only performed the pisiform triquetral joint steroid injection given that numbness had improved, she reports that this injection provided great relief of the wrist pain for about 5 weeks, improvement was about 80% better.      ROS/Meds/PSH/PMH/FH/SH: I personally reviewed the patients standard intake form.  Pertinents are discussed in the HPI    Physical Examination:  General: Awake and alert.  HEENT: Normocephalic, atraumatic  CV/Pulm: Breathing even and unlabored  Skin: No obvious rashes noted.  Lymphatic: No obvious evidence of lymphedema or lymphadenopathy    Musculoskeletal Examination:  Examination on the left upper extremity demonstrates cap refill < 5 seconds in all fingers, positive elbow flexion compression test, positive Tinel at the cubital tunnel, patient also has positive Mar compression test, she has tenderness palpation at the [place] form triquetral joint.    Imaging / Electrodiagnostic Tests:     Nerve conduction study were again reviewed, these demonstrate normal sensory latency on the left wrist median nerve at 2.9, normal motor latency of 2.8, normal motor conduction velocity across the elbow segment on the ulnar nerve at 66 m/s    Ultrasound examination of the cubital tunnel demonstrates ulnar nerve well posterior to the medial epicondyle, no subluxation, persistent cubital tunnel artery is noted    Assessment:   1. Neuropathy    2.

## 2024-11-19 DIAGNOSIS — G62.9 NEUROPATHY: Primary | ICD-10-CM

## 2024-11-19 RX ORDER — GABAPENTIN 100 MG/1
CAPSULE ORAL
Qty: 30 CAPSULE | Refills: 2 | Status: SHIPPED | OUTPATIENT
Start: 2024-11-19 | End: 2026-02-03

## (undated) DEVICE — SOLUTION IRRIG 3000ML 0.9% SOD CHL USP UROMATIC PLAS CONT

## (undated) DEVICE — PAD,ABDOMINAL,5"X9",ST,LF,25/BX: Brand: MEDLINE INDUSTRIES, INC.

## (undated) DEVICE — BUR SHV L13CM DIA4MM 8 FLUT OVL FOR RAP AGG BNE RESECT

## (undated) DEVICE — SHOULDER ARTHRO DR KOCH: Brand: MEDLINE INDUSTRIES, INC.

## (undated) DEVICE — SUTURE FIBERTAPE FIBERWIRE SZ 2-0 30IN NONABSORB BLU AR72377

## (undated) DEVICE — 3M™ MEDIPORE™ H SOFT CLOTH SURGICAL TAPE, 2863, 3 IN X 10 YD, 12/CASE: Brand: 3M™ MEDIPORE™

## (undated) DEVICE — KIT CHAIR TRIMANO FOAM W/ SUPP ARM DRP ERGONOMICALLY DESIGNED

## (undated) DEVICE — BLADE SHV L13CM DIA4MM BNE CUT AGG DEB COOLCUT

## (undated) DEVICE — PENCIL ES L3M BTTN SWCH HOLSTER W/ BLDE ELECTRD EDGE

## (undated) DEVICE — TUBING PMP L16FT MAIN DISP FOR AR-6400 AR-6475

## (undated) DEVICE — CANNULA ARTHSCP L3CM ID8MM DBL DAM 1 PC MOLD LO PROF FLNG

## (undated) DEVICE — TUBING, SUCTION, 1/4" X 10', STRAIGHT: Brand: MEDLINE

## (undated) DEVICE — PROBE ABLAT XL 90DEG ASPIR BPLR RF 1 PC ELECTRD ERGO HNDL

## (undated) DEVICE — GOWN,PREVENTION PLUS,XLN/XL,ST,24/CS: Brand: MEDLINE

## (undated) DEVICE — 4-PORT MANIFOLD: Brand: NEPTUNE 2

## (undated) DEVICE — SPONGE GZ W4XL4IN COT 12 PLY TYP VII WVN C FLD DSGN

## (undated) DEVICE — NEEDLE SUT PASS FOR ROT CUF LABRAL REP MULTFI SCORPION

## (undated) DEVICE — INTENDED FOR TISSUE SEPARATION, AND OTHER PROCEDURES THAT REQUIRE A SHARP SURGICAL BLADE TO PUNCTURE OR CUT.: Brand: BARD-PARKER ® STAINLESS STEEL BLADES

## (undated) DEVICE — 3M™ STERI-DRAPE™ U-DRAPE 1015: Brand: STERI-DRAPE™

## (undated) DEVICE — GARMENT,MEDLINE,DVT,INT,CALF,MED, GEN2: Brand: MEDLINE